# Patient Record
Sex: FEMALE | Race: WHITE | NOT HISPANIC OR LATINO | Employment: OTHER | URBAN - METROPOLITAN AREA
[De-identification: names, ages, dates, MRNs, and addresses within clinical notes are randomized per-mention and may not be internally consistent; named-entity substitution may affect disease eponyms.]

---

## 2017-02-19 ENCOUNTER — TELEPHONE (OUTPATIENT)
Dept: MEDICAL GROUP | Age: 67
End: 2017-02-19

## 2017-02-19 DIAGNOSIS — Z79.890 POSTMENOPAUSAL HRT (HORMONE REPLACEMENT THERAPY): ICD-10-CM

## 2017-02-19 RX ORDER — ESTRADIOL 2 MG/1
2 TABLET ORAL DAILY
Qty: 30 TAB | Refills: 0 | Status: SHIPPED | OUTPATIENT
Start: 2017-02-19 | End: 2017-05-25 | Stop reason: SDUPTHER

## 2017-02-19 NOTE — TELEPHONE ENCOUNTER
On call note:   Patient out of estradiol 2 mg last couple days or so and starting to feel irritable. Would like medication refilled.   Courtesy refill done for 30 day supply.   Patient advised otherwise to follow up with PCP.

## 2017-05-25 RX ORDER — ESTRADIOL 2 MG/1
TABLET ORAL
Qty: 30 TAB | Refills: 0 | Status: SHIPPED | OUTPATIENT
Start: 2017-05-25 | End: 2019-08-27

## 2017-08-14 RX ORDER — ESTRADIOL 2 MG/1
TABLET ORAL
Refills: 0 | OUTPATIENT
Start: 2017-08-14

## 2017-09-22 DIAGNOSIS — Z79.890 POSTMENOPAUSAL HRT (HORMONE REPLACEMENT THERAPY): ICD-10-CM

## 2017-09-22 DIAGNOSIS — Z79.899 MEDICATION MANAGEMENT: ICD-10-CM

## 2017-09-25 RX ORDER — PRAVASTATIN SODIUM 40 MG
TABLET ORAL
Qty: 90 TAB | Refills: 0 | Status: SHIPPED | OUTPATIENT
Start: 2017-09-25 | End: 2019-02-26

## 2017-09-25 RX ORDER — FENOFIBRATE 160 MG/1
TABLET ORAL
Qty: 90 TAB | Refills: 0 | Status: SHIPPED | OUTPATIENT
Start: 2017-09-25 | End: 2019-01-02 | Stop reason: SDUPTHER

## 2017-09-25 RX ORDER — ESTRADIOL 2 MG/1
TABLET ORAL
Qty: 90 TAB | Refills: 0 | Status: SHIPPED | OUTPATIENT
Start: 2017-09-25 | End: 2018-10-23 | Stop reason: SDUPTHER

## 2017-09-25 RX ORDER — OMEPRAZOLE 20 MG/1
CAPSULE, DELAYED RELEASE ORAL
Qty: 90 CAP | Refills: 0 | Status: SHIPPED | OUTPATIENT
Start: 2017-09-25 | End: 2019-02-26

## 2017-09-25 NOTE — TELEPHONE ENCOUNTER
Was the patient seen in the last year in this department? Yes     Does patient have an active prescription for medications requested? Yes     Received Request Via: Pharmacy     Last office visit: 12/07/2016  Last labs: 5/25/2016

## 2018-10-23 DIAGNOSIS — Z79.890 POSTMENOPAUSAL HRT (HORMONE REPLACEMENT THERAPY): ICD-10-CM

## 2018-10-23 RX ORDER — ESTRADIOL 2 MG/1
2 TABLET ORAL
Qty: 90 TAB | Refills: 0 | Status: SHIPPED | OUTPATIENT
Start: 2018-10-23 | End: 2019-01-02 | Stop reason: SDUPTHER

## 2018-12-12 RX ORDER — OMEPRAZOLE 20 MG/1
CAPSULE, DELAYED RELEASE ORAL
Qty: 90 CAP | Refills: 3 | Status: SHIPPED | OUTPATIENT
Start: 2018-12-12 | End: 2019-01-02 | Stop reason: SDUPTHER

## 2018-12-17 DIAGNOSIS — Z79.899 MEDICATION MANAGEMENT: ICD-10-CM

## 2018-12-17 RX ORDER — NICOTINE POLACRILEX 4 MG/1
1 GUM, CHEWING ORAL DAILY
Qty: 90 TAB | Refills: 3 | Status: SHIPPED | OUTPATIENT
Start: 2018-12-17 | End: 2018-12-18 | Stop reason: SDUPTHER

## 2018-12-18 DIAGNOSIS — Z79.899 MEDICATION MANAGEMENT: ICD-10-CM

## 2018-12-18 RX ORDER — NICOTINE POLACRILEX 4 MG/1
1 GUM, CHEWING ORAL DAILY
Qty: 90 TAB | Refills: 0 | Status: SHIPPED | OUTPATIENT
Start: 2018-12-18 | End: 2019-08-27

## 2018-12-18 NOTE — TELEPHONE ENCOUNTER
Was the patient seen in the last year in this department? Yes    Does patient have an active prescription for medications requested? Yes    Received Request Via: Patient     Patient needs refill sent to [facility]- it was sent to [facility] by mistake.

## 2019-01-02 ENCOUNTER — OFFICE VISIT (OUTPATIENT)
Dept: MEDICAL GROUP | Facility: MEDICAL CENTER | Age: 69
End: 2019-01-02
Payer: MEDICARE

## 2019-01-02 VITALS
WEIGHT: 180.12 LBS | TEMPERATURE: 98.2 F | HEART RATE: 86 BPM | OXYGEN SATURATION: 96 % | SYSTOLIC BLOOD PRESSURE: 122 MMHG | DIASTOLIC BLOOD PRESSURE: 78 MMHG | BODY MASS INDEX: 28.27 KG/M2 | HEIGHT: 67 IN

## 2019-01-02 DIAGNOSIS — Z79.890 POSTMENOPAUSAL HRT (HORMONE REPLACEMENT THERAPY): ICD-10-CM

## 2019-01-02 DIAGNOSIS — Z79.899 MEDICATION MANAGEMENT: ICD-10-CM

## 2019-01-02 DIAGNOSIS — E03.8 SUBCLINICAL HYPOTHYROIDISM: ICD-10-CM

## 2019-01-02 DIAGNOSIS — N18.30 CKD (CHRONIC KIDNEY DISEASE), STAGE III (HCC): ICD-10-CM

## 2019-01-02 DIAGNOSIS — Z00.00 HEALTH CARE MAINTENANCE: ICD-10-CM

## 2019-01-02 DIAGNOSIS — Z12.11 SCREENING FOR MALIGNANT NEOPLASM OF COLON: ICD-10-CM

## 2019-01-02 DIAGNOSIS — R73.01 IFG (IMPAIRED FASTING GLUCOSE): ICD-10-CM

## 2019-01-02 DIAGNOSIS — Z12.31 ENCOUNTER FOR SCREENING MAMMOGRAM FOR MALIGNANT NEOPLASM OF BREAST: ICD-10-CM

## 2019-01-02 DIAGNOSIS — E78.5 DYSLIPIDEMIA: ICD-10-CM

## 2019-01-02 PROCEDURE — 99214 OFFICE O/P EST MOD 30 MIN: CPT | Performed by: INTERNAL MEDICINE

## 2019-01-02 RX ORDER — OMEPRAZOLE 20 MG/1
CAPSULE, DELAYED RELEASE ORAL
Qty: 90 CAP | Refills: 3 | Status: SHIPPED | OUTPATIENT
Start: 2019-01-02 | End: 2019-12-04 | Stop reason: SDUPTHER

## 2019-01-02 RX ORDER — PRAVASTATIN SODIUM 40 MG
40 TABLET ORAL DAILY
Qty: 90 TAB | Refills: 0 | Status: SHIPPED | OUTPATIENT
Start: 2019-01-02 | End: 2019-02-26

## 2019-01-02 RX ORDER — ESTRADIOL 2 MG/1
2 TABLET ORAL
Qty: 90 TAB | Refills: 0 | Status: SHIPPED | OUTPATIENT
Start: 2019-01-02 | End: 2019-02-26 | Stop reason: SDUPTHER

## 2019-01-02 RX ORDER — FENOFIBRATE 160 MG/1
160 TABLET ORAL
Qty: 90 TAB | Refills: 0 | Status: SHIPPED | OUTPATIENT
Start: 2019-01-02 | End: 2019-02-26 | Stop reason: SDUPTHER

## 2019-01-02 ASSESSMENT — PATIENT HEALTH QUESTIONNAIRE - PHQ9: CLINICAL INTERPRETATION OF PHQ2 SCORE: 0

## 2019-01-02 NOTE — PROGRESS NOTES
CHIEF COMPLAINT  Chief Complaint   Patient presents with   • Medication Refill   IFG    HPI  Patient is a 68 y.o. female patient came to the office after 2 yrs for the following     IFG  The patient had elevated hemoglobin A1c and fasting blood sugar:     Ref. Range 9/4/2014 10:25 1/2/2015 07:59   A1c Range: 0.0-5.6 % 6.2 (H) 6.4 (H)        Ref. Range 5/25/2016    Glucose Range: 65-99 mg/dL 105 (H)      No polydipsia, polyphagia, polyuria.  No abdominal pain, weight loss, fatigue.  Diet: like sweets  Exercise: Daily   BMI: 28  Family history diabetes: Father     Dyslipidemia  The patient was on pravastatin 40 mg QD.  No myalgias, muscle cramps or pain.   Diet/exercise/BMI: As above  FH: mother     CKD stage III  The patient had decreased GFR, with normal creatinine and electrolytes.  No SOB, palpitations, peripheral swelling, change in urinary habits.   NSAIDs use: Almost daily  Fluid intake:  insufficient     Subclinical hypothyroidism  The patient had abnormal TSH:    Ref. Range 6/20/2014    TSH Latest Ref Range: 0.300-3.700 uIU/mL 5.100 (H)   No temperature intolerance. No change in hair/skin quality, BMs.   Family history of thyroid problems: Negative    HRT  The patient has been on estradiol daily, needs a refill.   Denies hot flushes, sweating, vaginal dryness, mood swings.     Reviewed PMH, PSH, FH, SH, ALL, HCM/IMM, no changes  Reviewed MEDS, no changes    Patient Active Problem List    Diagnosis Date Noted   • IFG (impaired fasting glucose) 01/02/2019   • CKD (chronic kidney disease), stage III (HCC) 01/02/2019   • Elevated TSH 12/03/2015   • Health care maintenance 12/03/2015   • Postmenopausal HRT (hormone replacement therapy) 06/25/2015   • Dyslipidemia 06/02/2015     CURRENT MEDICATIONS  Current Outpatient Prescriptions   Medication Sig Dispense Refill   • estradiol (ESTRACE) 2 MG Tab Take 1 Tab by mouth every day. 90 Tab 0   • fenofibrate (TRIGLIDE) 160 MG tablet Take 1 Tab by mouth every day. 90 Tab 0    • pravastatin (PRAVACHOL) 40 MG tablet Take 1 Tab by mouth every day. 90 Tab 0   • omeprazole (PRILOSEC) 20 MG delayed-release capsule TAKE 1 CAPSULE DAILY 90 Cap 3   • omeprazole (PRILOSEC) 20 MG delayed-release capsule TAKE 1 CAPSULE BY MOUTH EVERY DAY 90 Cap 0   • estradiol (ESTRACE) 2 MG Tab TAKE 1 TABLET BY MOUTH EVERY DAY 30 Tab 0   • fenofibrate (TRIGLIDE) 160 MG tablet Take 1 Tab by mouth every day. 90 Tab 0   • omeprazole (PRILOSEC) 20 MG Tablet Delayed Response delayed-release tablet Take 1 Tab by mouth every day. 90 Tab 0   • pravastatin (PRAVACHOL) 40 MG tablet TAKE 1 TABLET BY MOUTH EVERYDAY. 90 Tab 0   • azithromycin (ZITHROMAX) 500 MG tablet Take 1 Tab by mouth every day. 6 Tab 0   • fenofibrate (TRIGLIDE) 160 MG tablet TAKE 1 TABLET DAILY 90 Tab 1     No current facility-administered medications for this visit.      ALLERGIES  Allergies: Neosporin pain relieving  PAST MEDICAL HISTORY  Past Medical History:   Diagnosis Date   • Anxiety    • Dyslipidemia    • Hypovitaminosis D    • Memory problem    • Neck pain    • Panic attack      SURGICAL HISTORY  She  has a past surgical history that includes cervical fusion posterior (3/23/2009).  SOCIAL HISTORY  Social History   Substance Use Topics   • Smoking status: Never Smoker   • Smokeless tobacco: Never Used   • Alcohol use 0.6 oz/week     1 Standard drinks or equivalent per week     Social History     Social History Narrative    Lives with .     Children: 4.     Work: retired     FAMILY HISTORY  Family History   Problem Relation Age of Onset   • Cancer Mother         colon   • Heart Disease Mother    • Hypertension Mother    • Hyperlipidemia Mother    • Diabetes Father    • Psychiatry Neg Hx    • Stroke Neg Hx    • Thyroid Neg Hx      Family Status   Relation Status   • Mo (Not Specified)   • Fa (Not Specified)   • Neg Hx (Not Specified)     ROS   Constitutional: Negative for fever, chills.  HENT: Negative for congestion, sore throat.  Eyes:  "Negative for blurred vision.   Respiratory: Negative for cough, shortness of breath.  Cardiovascular: Negative for chest pain, palpitations.   Gastrointestinal: Negative for heartburn, nausea, abdominal pain.   Genitourinary: Negative for dysuria. And per HPI.  Musculoskeletal: Negative for significant myalgias, back pain and joint pain.   Skin: Negative for rash and itching.   Neuro: Negative for dizziness, weakness and headaches.   Endo/Heme/Allergies: Does not bruise/bleed easily. And per HPI.  Psychiatric/Behavioral: Negative for depression.    PHYSICAL EXAM   Blood pressure 122/78, pulse 86, temperature 36.8 °C (98.2 °F), temperature source Temporal, height 1.702 m (5' 7\"), weight 81.7 kg (180 lb 1.9 oz), SpO2 96 %, not currently breastfeeding. Body mass index is 28.21 kg/m².  General:  NAD, well appearing  HEENT:   NC/AT, PERRLA, EOMI, TMs are clear. Oropharyngeal mucosa is pink,  without lesions;  no cervical / supraclavicular  lymphadenopathy, no thyromegaly.    Cardiovascular: RRR.   No m/r/g. No carotid bruits .      Lungs:   CTAB, no w/r/r, no respiratory distress.  Abdomen: Soft, NT/ND + BS; no suprapubic tenderness; no masses or hepatosplenomegaly.  Extremities:  2+ DP and radial pulses bilaterally.  No c/c/e.   Skin:  Warm, dry.  No erythema. No rash.   Neurologic: Alert & oriented x 3. CN II-XII grossly intact. Brachioradialis / knee DTR are 2/4, symmetric. Strength and sensation grossly intact.  No focal deficits.  Psychiatric:  Affect normal, mood normal, judgment normal.    LABS     Labs are reviewed and discussed with a patient  Lab Results   Component Value Date/Time    CHOLSTRLTOT 244 (H) 05/25/2016 07:14 AM     (H) 05/25/2016 07:14 AM    HDL 47 05/25/2016 07:14 AM    TRIGLYCERIDE 352 (H) 05/25/2016 07:14 AM       Lab Results   Component Value Date/Time    SODIUM 139 05/25/2016 07:14 AM    POTASSIUM 4.3 05/25/2016 07:14 AM    CHLORIDE 106 05/25/2016 07:14 AM    CO2 25 05/25/2016 07:14 AM "    GLUCOSE 105 (H) 05/25/2016 07:14 AM    BUN 14 05/25/2016 07:14 AM    CREATININE 0.94 05/25/2016 07:14 AM     Lab Results   Component Value Date/Time    ALKPHOSPHAT 72 05/25/2016 07:14 AM    ASTSGOT 26 05/25/2016 07:14 AM    ALTSGPT 22 05/25/2016 07:14 AM    TBILIRUBIN 0.6 05/25/2016 07:14 AM      Lab Results   Component Value Date/Time    HBA1C 6.4 (H) 01/02/2015 07:59 AM    HBA1C 6.2 (H) 09/04/2014 10:25 AM     Lab Results   Component Value Date/Time    WBC 9.1 09/04/2014 10:25 AM    RBC 5.12 09/04/2014 10:25 AM    HEMOGLOBIN 15.5 09/04/2014 10:25 AM    HEMATOCRIT 46.1 09/04/2014 10:25 AM    MCV 90.0 09/04/2014 10:25 AM    MCH 30.3 09/04/2014 10:25 AM    MCHC 33.6 09/04/2014 10:25 AM    MPV 10.0 09/04/2014 10:25 AM    NEUTSPOLYS 54.0 09/04/2014 10:25 AM    LYMPHOCYTES 36.6 09/04/2014 10:25 AM    MONOCYTES 6.6 09/04/2014 10:25 AM    EOSINOPHILS 1.6 09/04/2014 10:25 AM    BASOPHILS 0.9 09/04/2014 10:25 AM      IMAGING     None    ASSESMENT AND PLAN        1. IFG (impaired fasting glucose)  Discussed about risk to develop DM.   Advised low carb diet, exercise, watch for WT.   - COMP METABOLIC PANEL; Future  - HEMOGLOBIN A1C; Future  - MICROALBUMIN CREAT RATIO URINE; Future    2. Dyslipidemia  Pending labs, restart pravastatin.    - COMP METABOLIC PANEL; Future  - Lipid Profile; Future  - pravastatin (PRAVACHOL) 40 MG tablet; Take 1 Tab by mouth every day.  Dispense: 90 Tab; Refill: 0    3. CKD (chronic kidney disease), stage III (HCC)  Advised to give good fluid intake .  30 ounces per day or 4 glasses and avoid NSAIDs.  - COMP METABOLIC PANEL; Future    4. Subclinical hypothyroidism  Follow-up labs  - TSH; Future  - FREE THYROXINE; Future    5. Postmenopausal HRT (hormone replacement therapy)  Refill, controlled:  - estradiol (ESTRACE) 2 MG Tab; Take 1 Tab by mouth every day.  Dispense: 90 Tab; Refill: 0    6. Encounter for screening mammogram for malignant neoplasm of breast  - MA-SCREEN MAMMO W/CAD-BILAT;  Standing    7. Screening for malignant neoplasm of colon  - REFERRAL TO GI FOR COLONOSCOPY    8. Medication management  - fenofibrate (TRIGLIDE) 160 MG tablet; Take 1 Tab by mouth every day.  Dispense: 90 Tab; Refill: 0    9.  Healthcare maintenance  Declined immunizations.    Counseling:   - Smoking:  Nonsmoker    Followup: Return in about 3 months (around 4/2/2019), or if symptoms worsen or fail to improve.    All questions are answered.    Please note that this dictation was created using voice recognition software, and that there might be errors of robert and possibly content.

## 2019-01-04 ENCOUNTER — TELEPHONE (OUTPATIENT)
Dept: MEDICAL GROUP | Facility: MEDICAL CENTER | Age: 69
End: 2019-01-04

## 2019-01-04 DIAGNOSIS — H54.7 VISION PROBLEM: ICD-10-CM

## 2019-01-04 NOTE — TELEPHONE ENCOUNTER
1. Caller Name: Rosie                                         Call Back Number: [phone] (home)       Patient approves a detailed voicemail message: no    2. SPECIFIC Action To Be Taken: Referral pending, please sign.    3. Diagnosis/Clinical Reason for Request: Eye Exam    4. Specialty & Provider Name/Lab/Imaging Location: [facility]    5. Is appointment scheduled for requested order/referral: yes - 1/3/19    Patient was not informed they will receive a return phone call from the office ONLY if there are any questions before processing their request. Advised to call back if they haven't received a call from the referral department in 5 days.

## 2019-01-17 ENCOUNTER — PATIENT OUTREACH (OUTPATIENT)
Dept: HEALTH INFORMATION MANAGEMENT | Facility: OTHER | Age: 69
End: 2019-01-17

## 2019-01-17 NOTE — PROGRESS NOTES
Outcome: NO ANSWER, NO VM    Please transfer to Patient Outreach Team at [phone] when patient returns call.    WebIZ Checked & Epic Updated:  yes    [facility] Verified: yes    Attempt # 1

## 2019-01-29 NOTE — PROGRESS NOTES
Outcome: NO ANSWER NO VMAIL    Please transfer to Patient Outreach Team at [phone] when patient returns call.       Attempt # 2

## 2019-01-31 NOTE — PROGRESS NOTES
Outcome: no answer / no vmail    Please transfer to Patient Outreach Team at [phone] when patient returns call.       Attempt # 3

## 2019-02-06 ENCOUNTER — HOSPITAL ENCOUNTER (OUTPATIENT)
Dept: LAB | Facility: MEDICAL CENTER | Age: 69
End: 2019-02-06
Attending: INTERNAL MEDICINE
Payer: MEDICARE

## 2019-02-06 DIAGNOSIS — R73.01 IFG (IMPAIRED FASTING GLUCOSE): ICD-10-CM

## 2019-02-06 DIAGNOSIS — E78.5 DYSLIPIDEMIA: ICD-10-CM

## 2019-02-06 DIAGNOSIS — E03.8 SUBCLINICAL HYPOTHYROIDISM: ICD-10-CM

## 2019-02-06 DIAGNOSIS — N18.30 CKD (CHRONIC KIDNEY DISEASE), STAGE III (HCC): ICD-10-CM

## 2019-02-06 LAB
ALBUMIN SERPL BCP-MCNC: 4.1 G/DL (ref 3.2–4.9)
ALBUMIN/GLOB SERPL: 1.8 G/DL
ALP SERPL-CCNC: 77 U/L (ref 30–99)
ALT SERPL-CCNC: 20 U/L (ref 2–50)
ANION GAP SERPL CALC-SCNC: 7 MMOL/L (ref 0–11.9)
AST SERPL-CCNC: 18 U/L (ref 12–45)
BILIRUB SERPL-MCNC: 0.4 MG/DL (ref 0.1–1.5)
BUN SERPL-MCNC: 21 MG/DL (ref 8–22)
CALCIUM SERPL-MCNC: 9 MG/DL (ref 8.5–10.5)
CHLORIDE SERPL-SCNC: 105 MMOL/L (ref 96–112)
CHOLEST SERPL-MCNC: 221 MG/DL (ref 100–199)
CO2 SERPL-SCNC: 27 MMOL/L (ref 20–33)
CREAT SERPL-MCNC: 0.86 MG/DL (ref 0.5–1.4)
CREAT UR-MCNC: 62 MG/DL
EST. AVERAGE GLUCOSE BLD GHB EST-MCNC: 128 MG/DL
FASTING STATUS PATIENT QL REPORTED: NORMAL
GLOBULIN SER CALC-MCNC: 2.3 G/DL (ref 1.9–3.5)
GLUCOSE SERPL-MCNC: 96 MG/DL (ref 65–99)
HBA1C MFR BLD: 6.1 % (ref 0–5.6)
HDLC SERPL-MCNC: 63 MG/DL
LDLC SERPL CALC-MCNC: 121 MG/DL
MICROALBUMIN UR-MCNC: <0.7 MG/DL
MICROALBUMIN/CREAT UR: NORMAL MG/G (ref 0–30)
POTASSIUM SERPL-SCNC: 4.1 MMOL/L (ref 3.6–5.5)
PROT SERPL-MCNC: 6.4 G/DL (ref 6–8.2)
SODIUM SERPL-SCNC: 139 MMOL/L (ref 135–145)
T4 FREE SERPL-MCNC: 0.67 NG/DL (ref 0.53–1.43)
TRIGL SERPL-MCNC: 187 MG/DL (ref 0–149)
TSH SERPL DL<=0.005 MIU/L-ACNC: 5.05 UIU/ML (ref 0.38–5.33)

## 2019-02-06 PROCEDURE — 83036 HEMOGLOBIN GLYCOSYLATED A1C: CPT

## 2019-02-06 PROCEDURE — 80061 LIPID PANEL: CPT

## 2019-02-06 PROCEDURE — 82570 ASSAY OF URINE CREATININE: CPT

## 2019-02-06 PROCEDURE — 84439 ASSAY OF FREE THYROXINE: CPT

## 2019-02-06 PROCEDURE — 36415 COLL VENOUS BLD VENIPUNCTURE: CPT

## 2019-02-06 PROCEDURE — 82043 UR ALBUMIN QUANTITATIVE: CPT

## 2019-02-06 PROCEDURE — 84443 ASSAY THYROID STIM HORMONE: CPT

## 2019-02-06 PROCEDURE — 80053 COMPREHEN METABOLIC PANEL: CPT

## 2019-02-25 ENCOUNTER — TELEPHONE (OUTPATIENT)
Dept: MEDICAL GROUP | Facility: MEDICAL CENTER | Age: 69
End: 2019-02-25

## 2019-02-25 NOTE — TELEPHONE ENCOUNTER
ESTABLISHED PATIENT PRE-VISIT PLANNING     Patient was contacted to complete PVP.     Note: Patient will not be contacted if there is no indication to call.     1.  Reviewed notes from the last few office visits within the medical group: Yes    2.  If any orders were placed at last visit or intended to be done for this visit (i.e. 6 mos follow-up), do we have Results/Consult Notes?        •  Labs - Labs ordered, completed on 2/6/19 and results are in chart.   Note: If patient appointment is for lab review and patient did not complete labs, check with provider if OK to reschedule patient until labs completed.       •  Imaging - Imaging was not ordered at last office visit.       •  Referrals - Referral ordered, patient has NOT been seen.    3. Is this appointment scheduled as a Hospital Follow-Up? No    4.  Immunizations were updated in Epic using WebIZ?: Epic matches WebIZ       •  Web Iz Recommendations: FLU, PREVNAR (PCV13) , TD and SHINGRIX (Shingles)    5.  Patient is due for the following Health Maintenance Topics:   Health Maintenance Due   Topic Date Due   • IMM ZOSTER VACCINES (1 of 2) 09/29/1998   • IMM PNEUMOCOCCAL 65+ (ADULT) LOW/MEDIUM RISK SERIES (1 of 2 - PCV13) 09/29/2013   • MAMMOGRAM  06/02/2016   • IMM INFLUENZA (1) 09/01/2018       - Patient plans to schedule appointment for Immunizations: FLU, PREVNAR (PCV13)  and SHINGRIX (Shingles) and Mammogram. Pt states she would like to update her health maintenance but she needs to wait for her  and talk with her PCP first.    6. Orders for overdue Health Maintenance topics pended in Pre-Charting? NO    7.  AHA (MDX) form printed for Provider? YES    8.  Patient was informed to arrive 15 min prior to their scheduled appointment and bring in their medication bottles.

## 2019-02-26 ENCOUNTER — OFFICE VISIT (OUTPATIENT)
Dept: MEDICAL GROUP | Facility: MEDICAL CENTER | Age: 69
End: 2019-02-26
Payer: MEDICARE

## 2019-02-26 VITALS
DIASTOLIC BLOOD PRESSURE: 78 MMHG | BODY MASS INDEX: 28.55 KG/M2 | WEIGHT: 181.88 LBS | OXYGEN SATURATION: 95 % | HEART RATE: 69 BPM | SYSTOLIC BLOOD PRESSURE: 112 MMHG | HEIGHT: 67 IN | TEMPERATURE: 97.8 F

## 2019-02-26 DIAGNOSIS — Z79.890 POSTMENOPAUSAL HRT (HORMONE REPLACEMENT THERAPY): ICD-10-CM

## 2019-02-26 DIAGNOSIS — E78.5 DYSLIPIDEMIA: ICD-10-CM

## 2019-02-26 DIAGNOSIS — R73.01 IFG (IMPAIRED FASTING GLUCOSE): ICD-10-CM

## 2019-02-26 DIAGNOSIS — Z00.00 HEALTH CARE MAINTENANCE: ICD-10-CM

## 2019-02-26 DIAGNOSIS — N18.30 CKD (CHRONIC KIDNEY DISEASE), STAGE III (HCC): ICD-10-CM

## 2019-02-26 PROCEDURE — 99214 OFFICE O/P EST MOD 30 MIN: CPT | Performed by: INTERNAL MEDICINE

## 2019-02-26 RX ORDER — FENOFIBRATE 160 MG/1
160 TABLET ORAL
Qty: 90 TAB | Refills: 3 | Status: SHIPPED | OUTPATIENT
Start: 2019-02-26 | End: 2019-08-27 | Stop reason: SDUPTHER

## 2019-02-26 RX ORDER — ESTRADIOL 2 MG/1
2 TABLET ORAL
Qty: 90 TAB | Refills: 0 | Status: SHIPPED | OUTPATIENT
Start: 2019-02-26 | End: 2019-08-27 | Stop reason: SDUPTHER

## 2019-02-26 RX ORDER — ATORVASTATIN CALCIUM 40 MG/1
40 TABLET, FILM COATED ORAL DAILY
Qty: 90 TAB | Refills: 1 | Status: SHIPPED | OUTPATIENT
Start: 2019-02-26 | End: 2019-08-27 | Stop reason: SDUPTHER

## 2019-02-26 NOTE — PROGRESS NOTES
CHIEF COMPLAINT  Chief Complaint   Patient presents with   • Results   IFG    HPI  Patient is a 68 y.o. female patient who presents today for the following     IFG  The patient had elevated hemoglobin A1c and fasting blood sugar:   No abdominal pain, weight loss, fatigue.  Diet: like sweets  Exercise: Daily   BMI: 28  Family history diabetes: Father     Dyslipidemia  Not at goal.    The patient was on pravastatin 40 mg QD.  No myalgias, muscle cramps or pain.   Diet/exercise/BMI: As above  FH: mother     CKD stage III  The patient had decreased GFR, with normal creatinine and electrolytes.  No SOB, palpitations, peripheral swelling, change in urinary habits.   NSAIDs use: Almost daily  Fluid intake: insufficient     HRT  The patient has been on estradiol daily, needs a refill.   Denies hot flushes, sweating, vaginal dryness, mood swings.    Reviewed PMH, PSH, FH, SH, ALL, HCM/IMM, no changes  Reviewed MEDS, no changes    Patient Active Problem List    Diagnosis Date Noted   • IFG (impaired fasting glucose) 01/02/2019   • CKD (chronic kidney disease), stage III (HCC) 01/02/2019   • Health care maintenance 12/03/2015   • Postmenopausal HRT (hormone replacement therapy) 06/25/2015   • Dyslipidemia 06/02/2015     CURRENT MEDICATIONS  Current Outpatient Prescriptions   Medication Sig Dispense Refill   • atorvastatin (LIPITOR) 40 MG Tab Take 1 Tab by mouth every day. 90 Tab 1   • fenofibrate (TRIGLIDE) 160 MG tablet Take 1 Tab by mouth every day. 90 Tab 3   • estradiol (ESTRACE) 2 MG Tab Take 1 Tab by mouth every day. 90 Tab 0   • estradiol (ESTRACE) 2 MG Tab TAKE 1 TABLET BY MOUTH EVERY DAY 30 Tab 0   • fenofibrate (TRIGLIDE) 160 MG tablet Take 1 Tab by mouth every day. 90 Tab 0   • omeprazole (PRILOSEC) 20 MG delayed-release capsule TAKE 1 CAPSULE DAILY 90 Cap 3   • omeprazole (PRILOSEC) 20 MG Tablet Delayed Response delayed-release tablet Take 1 Tab by mouth every day. 90 Tab 0   • azithromycin (ZITHROMAX) 500 MG  tablet Take 1 Tab by mouth every day. 6 Tab 0   • fenofibrate (TRIGLIDE) 160 MG tablet TAKE 1 TABLET DAILY 90 Tab 1     No current facility-administered medications for this visit.      ALLERGIES  Allergies: Neosporin pain relieving  PAST MEDICAL HISTORY  Past Medical History:   Diagnosis Date   • Anxiety    • Dyslipidemia    • Hypovitaminosis D    • Memory problem    • Neck pain    • Panic attack      SURGICAL HISTORY  She  has a past surgical history that includes cervical fusion posterior (3/23/2009).  SOCIAL HISTORY  Social History   Substance Use Topics   • Smoking status: Never Smoker   • Smokeless tobacco: Never Used   • Alcohol use 0.6 oz/week     1 Standard drinks or equivalent per week     Social History     Social History Narrative    Lives with .     Children: 4.     Work: retired     FAMILY HISTORY  Family History   Problem Relation Age of Onset   • Cancer Mother         colon   • Heart Disease Mother    • Hypertension Mother    • Hyperlipidemia Mother    • Diabetes Father    • Psychiatry Neg Hx    • Stroke Neg Hx    • Thyroid Neg Hx      Family Status   Relation Status   • Mo (Not Specified)   • Fa (Not Specified)   • Neg Hx (Not Specified)     ROS   Constitutional: Negative for fever, chills.  HENT: Negative for congestion, sore throat.  Eyes: Negative for blurred vision.   Respiratory: Negative for cough, shortness of breath.  Cardiovascular: Negative for chest pain, palpitations.   Gastrointestinal: Negative for heartburn, abdominal pain.   Genitourinary: Negative for dysuria. And per HPI.  Musculoskeletal: Negative for significant  back and joint pain.   Skin: Negative for rash and itching.   Neuro: Negative for dizziness, weakness and headaches.   Endo/Heme/Allergies: Does not bruise/bleed easily. And per HPI.  Psychiatric/Behavioral: Negative for depression.    PHYSICAL EXAM   Blood pressure 112/78, pulse 69, temperature 36.6 °C (97.8 °F), temperature source Temporal, height 1.702 m (5'  "7\"), weight 82.5 kg (181 lb 14.1 oz), SpO2 95 %, not currently breastfeeding. Body mass index is 28.49 kg/m².  General:  NAD, well appearing  HEENT:   NC/AT, PERRLA, EOMI, TMs are clear. Oropharyngeal mucosa is pink,  without lesions;  no cervical / supraclavicular  lymphadenopathy, no thyromegaly.    Cardiovascular: RRR.   No m/r/g. No carotid bruits .      Lungs:   CTAB, no w/r/r, no respiratory distress.  Abdomen: Soft, NT/ND..  Extremities:  2+ DP and radial pulses bilaterally.  No c/c/e.   Skin:  Warm, dry.  No erythema. No rash.   Neurologic: Alert & oriented x 3. CN II-XII grossly intact. No focal deficits.  Psychiatric:  Affect normal, mood normal, judgment normal.    LABS     Labs are reviewed and discussed with a patient  Lab Results   Component Value Date/Time    CHOLSTRLTOT 221 (H) 02/06/2019 06:13 AM     (H) 02/06/2019 06:13 AM    HDL 63 02/06/2019 06:13 AM    TRIGLYCERIDE 187 (H) 02/06/2019 06:13 AM       Lab Results   Component Value Date/Time    SODIUM 139 02/06/2019 06:13 AM    POTASSIUM 4.1 02/06/2019 06:13 AM    CHLORIDE 105 02/06/2019 06:13 AM    CO2 27 02/06/2019 06:13 AM    GLUCOSE 96 02/06/2019 06:13 AM    BUN 21 02/06/2019 06:13 AM    CREATININE 0.86 02/06/2019 06:13 AM     Lab Results   Component Value Date/Time    ALKPHOSPHAT 77 02/06/2019 06:13 AM    ASTSGOT 18 02/06/2019 06:13 AM    ALTSGPT 20 02/06/2019 06:13 AM    TBILIRUBIN 0.4 02/06/2019 06:13 AM      Lab Results   Component Value Date/Time    HBA1C 6.1 (H) 02/06/2019 06:13 AM    HBA1C 6.4 (H) 01/02/2015 07:59 AM    HBA1C 6.2 (H) 09/04/2014 10:25 AM     No results found for: TSH  Lab Results   Component Value Date/Time    FREET4 0.67 02/06/2019 06:13 AM     Lab Results   Component Value Date/Time    WBC 9.1 09/04/2014 10:25 AM    RBC 5.12 09/04/2014 10:25 AM    HEMOGLOBIN 15.5 09/04/2014 10:25 AM    HEMATOCRIT 46.1 09/04/2014 10:25 AM    MCV 90.0 09/04/2014 10:25 AM    MCH 30.3 09/04/2014 10:25 AM    MCHC 33.6 09/04/2014 10:25 " AM    MPV 10.0 09/04/2014 10:25 AM    NEUTSPOLYS 54.0 09/04/2014 10:25 AM    LYMPHOCYTES 36.6 09/04/2014 10:25 AM    MONOCYTES 6.6 09/04/2014 10:25 AM    EOSINOPHILS 1.6 09/04/2014 10:25 AM    BASOPHILS 0.9 09/04/2014 10:25 AM        IMAGING     None    ASSESMENT AND PLAN        1. IFG (impaired fasting glucose)  Improved, continue current lifestyle.  - Comp Metabolic Panel; Future  - HEMOGLOBIN A1C; Future    2. Dyslipidemia  Not at goal, change pravastatin to atorvastatin::    - Comp Metabolic Panel; Future  - Lipid Profile; Future  - fenofibrate (TRIGLIDE) 160 MG tablet; Take 1 Tab by mouth every day.  Dispense: 90 Tab; Refill: 3    3. CKD (chronic kidney disease), stage III (HCC)  Improved, continue good fluid intake  - Comp Metabolic Panel; Future    4. Postmenopausal HRT (hormone replacement therapy)  Controlled postmenopausal symptoms, continue current treatment  - estradiol (ESTRACE) 2 MG Tab; Take 1 Tab by mouth every day.  Dispense: 90 Tab; Refill: 0    5. Health care maintenance  Declined mammography, colonoscopy.  Advised immunizations x3.    Counseling:   - Smoking:  Nonsmoker    Followup: Return in about 6 months (around 8/26/2019).    All questions are answered.    Please note that this dictation was created using voice recognition software, and that there might be errors of robert and possibly content.

## 2019-04-25 DIAGNOSIS — M79.89 LEG SWELLING: ICD-10-CM

## 2019-04-25 RX ORDER — HYDROCHLOROTHIAZIDE 12.5 MG/1
12.5 CAPSULE, GELATIN COATED ORAL DAILY
Qty: 90 CAP | Refills: 1 | Status: SHIPPED | OUTPATIENT
Start: 2019-04-25 | End: 2019-08-27 | Stop reason: SDUPTHER

## 2019-04-30 ENCOUNTER — TELEPHONE (OUTPATIENT)
Dept: MEDICAL GROUP | Facility: MEDICAL CENTER | Age: 69
End: 2019-04-30

## 2019-04-30 DIAGNOSIS — H54.7 VISION PROBLEM: ICD-10-CM

## 2019-05-06 ENCOUNTER — HOSPITAL ENCOUNTER (OUTPATIENT)
Dept: RADIOLOGY | Facility: MEDICAL CENTER | Age: 69
End: 2019-05-06
Attending: PODIATRIST
Payer: MEDICARE

## 2019-05-06 DIAGNOSIS — I74.3 EMBOLISM AND THROMBOSIS OF ARTERIES OF LOWER EXTREMITY (HCC): ICD-10-CM

## 2019-05-06 PROCEDURE — 93970 EXTREMITY STUDY: CPT | Mod: 26 | Performed by: SURGERY

## 2019-05-06 PROCEDURE — 93970 EXTREMITY STUDY: CPT

## 2019-08-17 ENCOUNTER — HOSPITAL ENCOUNTER (OUTPATIENT)
Dept: LAB | Facility: MEDICAL CENTER | Age: 69
End: 2019-08-17
Attending: INTERNAL MEDICINE
Payer: MEDICARE

## 2019-08-17 DIAGNOSIS — R73.01 IFG (IMPAIRED FASTING GLUCOSE): ICD-10-CM

## 2019-08-17 DIAGNOSIS — N18.30 CKD (CHRONIC KIDNEY DISEASE), STAGE III (HCC): ICD-10-CM

## 2019-08-17 DIAGNOSIS — E78.5 DYSLIPIDEMIA: ICD-10-CM

## 2019-08-17 LAB
ALBUMIN SERPL BCP-MCNC: 4.2 G/DL (ref 3.2–4.9)
ALBUMIN/GLOB SERPL: 1.6 G/DL
ALP SERPL-CCNC: 59 U/L (ref 30–99)
ALT SERPL-CCNC: 14 U/L (ref 2–50)
ANION GAP SERPL CALC-SCNC: 7 MMOL/L (ref 0–11.9)
AST SERPL-CCNC: 15 U/L (ref 12–45)
BILIRUB SERPL-MCNC: 0.6 MG/DL (ref 0.1–1.5)
BUN SERPL-MCNC: 18 MG/DL (ref 8–22)
CALCIUM SERPL-MCNC: 9.4 MG/DL (ref 8.5–10.5)
CHLORIDE SERPL-SCNC: 110 MMOL/L (ref 96–112)
CHOLEST SERPL-MCNC: 170 MG/DL (ref 100–199)
CO2 SERPL-SCNC: 25 MMOL/L (ref 20–33)
CREAT SERPL-MCNC: 0.96 MG/DL (ref 0.5–1.4)
EST. AVERAGE GLUCOSE BLD GHB EST-MCNC: 128 MG/DL
GLOBULIN SER CALC-MCNC: 2.7 G/DL (ref 1.9–3.5)
GLUCOSE SERPL-MCNC: 99 MG/DL (ref 65–99)
HBA1C MFR BLD: 6.1 % (ref 0–5.6)
HDLC SERPL-MCNC: 48 MG/DL
LDLC SERPL CALC-MCNC: 93 MG/DL
POTASSIUM SERPL-SCNC: 4.2 MMOL/L (ref 3.6–5.5)
PROT SERPL-MCNC: 6.9 G/DL (ref 6–8.2)
SODIUM SERPL-SCNC: 142 MMOL/L (ref 135–145)
TRIGL SERPL-MCNC: 144 MG/DL (ref 0–149)

## 2019-08-17 PROCEDURE — 80061 LIPID PANEL: CPT

## 2019-08-17 PROCEDURE — 36415 COLL VENOUS BLD VENIPUNCTURE: CPT

## 2019-08-17 PROCEDURE — 83036 HEMOGLOBIN GLYCOSYLATED A1C: CPT

## 2019-08-17 PROCEDURE — 80053 COMPREHEN METABOLIC PANEL: CPT

## 2019-08-21 ENCOUNTER — TELEPHONE (OUTPATIENT)
Dept: MEDICAL GROUP | Facility: MEDICAL CENTER | Age: 69
End: 2019-08-21

## 2019-08-21 NOTE — TELEPHONE ENCOUNTER
ESTABLISHED PATIENT PRE-VISIT PLANNING     Patient was NOT contacted to complete PVP.     Note: Patient will not be contacted if there is no indication to call.     1.  Reviewed notes from the last few office visits within the medical group: Yes    2.  If any orders were placed at last visit or intended to be done for this visit (i.e. 6 mos follow-up), do we have Results/Consult Notes?        •  Labs - Labs ordered, completed on 08/17/2019 and results are in chart.   Note: If patient appointment is for lab review and patient did not complete labs, check with provider if OK to reschedule patient until labs completed.       •  Imaging - Imaging was not ordered at last office visit.       •  Referrals - Referral ordered, patient was seen and consult notes are in chart. Care Teams updated  YES.    3. Is this appointment scheduled as a Hospital Follow-Up? No    4.  Immunizations were updated in Epic using WebIZ?: Epic matches WebIZ       •  Web Iz Recommendations: PREVNAR (PCV13)  and SHINGRIX (Shingles)    5.  Patient is due for the following Health Maintenance Topics:   Health Maintenance Due   Topic Date Due   • HEPATITIS C SCREENING  1950   • IMM HEP B VACCINE (1 of 3 - Risk 3-dose series) 09/29/1967   • IMM ZOSTER VACCINES (1 of 2) 09/29/1998   • IMM PNEUMOCOCCAL VACCINE: 65+ Years (1 of 2 - PCV13) 09/29/2013   • MAMMOGRAM  06/02/2016       - Patient has completed TDAP Immunization(s) per WebIZ. Chart has been updated.    6. Orders for overdue Health Maintenance topics pended in Pre-Charting? NO    7.  AHA (MDX) form printed for Provider? No, already completed    8.  Patient was NOT informed to arrive 15 min prior to their scheduled appointment and bring in their medication bottles.

## 2019-08-27 ENCOUNTER — OFFICE VISIT (OUTPATIENT)
Dept: MEDICAL GROUP | Facility: MEDICAL CENTER | Age: 69
End: 2019-08-27
Payer: MEDICARE

## 2019-08-27 VITALS
OXYGEN SATURATION: 96 % | HEIGHT: 67 IN | BODY MASS INDEX: 27.92 KG/M2 | TEMPERATURE: 96.9 F | DIASTOLIC BLOOD PRESSURE: 60 MMHG | HEART RATE: 59 BPM | SYSTOLIC BLOOD PRESSURE: 110 MMHG | WEIGHT: 177.91 LBS

## 2019-08-27 DIAGNOSIS — Z11.59 NEED FOR HEPATITIS C SCREENING TEST: ICD-10-CM

## 2019-08-27 DIAGNOSIS — R73.01 IFG (IMPAIRED FASTING GLUCOSE): ICD-10-CM

## 2019-08-27 DIAGNOSIS — N18.30 CKD (CHRONIC KIDNEY DISEASE), STAGE III (HCC): ICD-10-CM

## 2019-08-27 DIAGNOSIS — K21.9 GASTROESOPHAGEAL REFLUX DISEASE, ESOPHAGITIS PRESENCE NOT SPECIFIED: ICD-10-CM

## 2019-08-27 DIAGNOSIS — Z12.31 ENCOUNTER FOR SCREENING MAMMOGRAM FOR MALIGNANT NEOPLASM OF BREAST: ICD-10-CM

## 2019-08-27 DIAGNOSIS — E78.5 DYSLIPIDEMIA: ICD-10-CM

## 2019-08-27 DIAGNOSIS — M79.89 LEG SWELLING: ICD-10-CM

## 2019-08-27 DIAGNOSIS — Z79.890 POSTMENOPAUSAL HRT (HORMONE REPLACEMENT THERAPY): ICD-10-CM

## 2019-08-27 DIAGNOSIS — Z00.00 HEALTH CARE MAINTENANCE: ICD-10-CM

## 2019-08-27 PROCEDURE — 99214 OFFICE O/P EST MOD 30 MIN: CPT | Performed by: INTERNAL MEDICINE

## 2019-08-27 RX ORDER — ATORVASTATIN CALCIUM 40 MG/1
40 TABLET, FILM COATED ORAL DAILY
Qty: 90 TAB | Refills: 1 | Status: SHIPPED | OUTPATIENT
Start: 2019-08-27 | End: 2019-12-04 | Stop reason: SDUPTHER

## 2019-08-27 RX ORDER — HYDROCHLOROTHIAZIDE 12.5 MG/1
12.5 CAPSULE, GELATIN COATED ORAL DAILY
Qty: 90 CAP | Refills: 1 | Status: SHIPPED | OUTPATIENT
Start: 2019-08-27 | End: 2019-12-04 | Stop reason: SDUPTHER

## 2019-08-27 RX ORDER — FENOFIBRATE 160 MG/1
160 TABLET ORAL
Qty: 90 TAB | Refills: 3 | Status: SHIPPED | OUTPATIENT
Start: 2019-08-27 | End: 2019-12-04 | Stop reason: SDUPTHER

## 2019-08-27 RX ORDER — ESTRADIOL 2 MG/1
2 TABLET ORAL
Qty: 90 TAB | Refills: 3 | Status: SHIPPED | OUTPATIENT
Start: 2019-08-27 | End: 2020-12-31 | Stop reason: SDUPTHER

## 2019-08-27 NOTE — PROGRESS NOTES
CHIEF COMPLAINT  IFG, labs    HPI  Jessica Sorensen is a 68 y.o. female who presents today for the following     IFG  The patient had elevated hemoglobin A1c and fasting blood sugar:   No abdominal pain, weight loss, fatigue.  Diet: like sweets  Exercise: Daily   BMI: 28  Family history diabetes: Father     Dyslipidemia  The patient was on pravastatin 40 mg QD.  No myalgias, muscle cramps or pain.   Diet/exercise/BMI: As above  FH: mother     CKD stage III  The patient had decreased GFR, with normal creatinine and electrolytes.  No SOB, palpitations, peripheral swelling, change in urinary habits.   NSAIDs use: Almost daily  Fluid intake: insufficient     GERD  On omeprazole, 20 mg QD; takes medication as prescribed, that controls sx.   Denies:   - heartburn, epigastric pain.   -  nausea, vomiting, or diarrhea  - dysphagia  - abnormal cough  - blood in the stool or dark tarry stools.  - early satiety  - tobacco use.    HRT  The patient has been on estradiol daily, needs a refill.   Denies hot flushes, sweating, vaginal dryness, mood swings.     Reviewed PMH, PSH, FH, SH, ALL, HCM/IMM, no changes  Reviewed MEDS, no changes    Patient Active Problem List    Diagnosis Date Noted   • IFG (impaired fasting glucose) 01/02/2019   • CKD (chronic kidney disease), stage III (HCC) 01/02/2019   • Health care maintenance 12/03/2015   • Postmenopausal HRT (hormone replacement therapy) 06/25/2015   • Dyslipidemia 06/02/2015     CURRENT MEDICATIONS  Current Outpatient Medications   Medication Sig Dispense Refill   • hydrochlorothiazide (MICROZIDE) 12.5 MG capsule Take 1 Cap by mouth every day. 90 Cap 1   • atorvastatin (LIPITOR) 40 MG Tab Take 1 Tab by mouth every day. 90 Tab 1   • fenofibrate (TRIGLIDE) 160 MG tablet Take 1 Tab by mouth every day. 90 Tab 3   • estradiol (ESTRACE) 2 MG Tab Take 1 Tab by mouth every day. 90 Tab 0   • omeprazole (PRILOSEC) 20 MG delayed-release capsule TAKE 1 CAPSULE DAILY 90 Cap 3   • omeprazole  (PRILOSEC) 20 MG Tablet Delayed Response delayed-release tablet Take 1 Tab by mouth every day. 90 Tab 0   • estradiol (ESTRACE) 2 MG Tab TAKE 1 TABLET BY MOUTH EVERY DAY 30 Tab 0   • fenofibrate (TRIGLIDE) 160 MG tablet Take 1 Tab by mouth every day. 90 Tab 0     No current facility-administered medications for this visit.      ALLERGIES  Allergies: Neosporin pain relieving  PAST MEDICAL HISTORY  Past Medical History:   Diagnosis Date   • Anxiety    • Dyslipidemia    • Hypovitaminosis D    • Memory problem    • Neck pain    • Panic attack      SURGICAL HISTORY  She  has a past surgical history that includes cervical fusion posterior (3/23/2009).  SOCIAL HISTORY  Social History     Tobacco Use   • Smoking status: Never Smoker   • Smokeless tobacco: Never Used   Substance Use Topics   • Alcohol use: Yes     Alcohol/week: 0.6 oz     Types: 1 Standard drinks or equivalent per week   • Drug use: Not on file     Social History     Social History Narrative    Lives with .     Children: 4.     Work: retired     FAMILY HISTORY  Family History   Problem Relation Age of Onset   • Cancer Mother         colon   • Heart Disease Mother    • Hypertension Mother    • Hyperlipidemia Mother    • Diabetes Father    • Psychiatric Illness Neg Hx    • Stroke Neg Hx    • Thyroid Neg Hx      Family Status   Relation Name Status   • Mo  (Not Specified)   • Fa  (Not Specified)   • Neg Hx  (Not Specified)     ROS   Constitutional: Negative for fever, chills, fatigue.  HENT: Negative for congestion, sore throat.  Eyes: Negative for vision problems.   Respiratory: Negative for cough, shortness of breath.  Cardiovascular: Negative for chest pain, palpitations.   Gastrointestinal: Negative for heartburn, nausea, abdominal pain.   Genitourinary: Negative for dysuria.  Musculoskeletal: Negative for significant myalgia, back and joint pain.   Skin: Negative for rash.   Neuro: Negative for dizziness, weakness and headaches.  "  Endo/Heme/Allergies: Does not bruise/bleed easily.   Psychiatric/Behavioral: Negative for depression.    PHYSICAL EXAM   /60   Pulse (!) 59   Temp 36.1 °C (96.9 °F) (Temporal)   Ht 1.702 m (5' 7\")   Wt 80.7 kg (177 lb 14.6 oz)   SpO2 96%   BMI 27.86 kg/m²   General:  NAD, well appearing  HEENT:   NC/AT, PERRLA, EOMI, TMs are clear. Oropharyngeal mucosa is pink,  without lesions;  no cervical / supraclavicular  lymphadenopathy, no thyromegaly.    Cardiovascular: RRR.   No m/r/g.       Lungs:   CTAB, no w/r/r, no respiratory distress.  Abdomen: Soft, NT/ND; no hepatosplenomegaly.  Extremities:  2+ DP and radial pulses bilaterally.  No c/c/e.   Skin:  Warm, dry.  No erythema. No rash.   Neurologic: Alert & oriented x 3. CN II-XII grossly intact. No focal deficits.  Psychiatric:  Affect normal, mood normal, judgment normal.    Labs     Labs are reviewed and discussed with a patient  Lab Results   Component Value Date/Time    CHOLSTRLTOT 170 08/17/2019 07:28 AM    LDL 93 08/17/2019 07:28 AM    HDL 48 08/17/2019 07:28 AM    TRIGLYCERIDE 144 08/17/2019 07:28 AM       Lab Results   Component Value Date/Time    SODIUM 142 08/17/2019 07:28 AM    POTASSIUM 4.2 08/17/2019 07:28 AM    CHLORIDE 110 08/17/2019 07:28 AM    CO2 25 08/17/2019 07:28 AM    GLUCOSE 99 08/17/2019 07:28 AM    BUN 18 08/17/2019 07:28 AM    CREATININE 0.96 08/17/2019 07:28 AM     Lab Results   Component Value Date/Time    ALKPHOSPHAT 59 08/17/2019 07:28 AM    ASTSGOT 15 08/17/2019 07:28 AM    ALTSGPT 14 08/17/2019 07:28 AM    TBILIRUBIN 0.6 08/17/2019 07:28 AM      Lab Results   Component Value Date/Time    HBA1C 6.1 (H) 08/17/2019 07:28 AM    HBA1C 6.1 (H) 02/06/2019 06:13 AM    HBA1C 6.4 (H) 01/02/2015 07:59 AM     No results found for: TSH  Lab Results   Component Value Date/Time    FREET4 0.67 02/06/2019 06:13 AM     Lab Results   Component Value Date/Time    WBC 9.1 09/04/2014 10:25 AM    RBC 5.12 09/04/2014 10:25 AM    HEMOGLOBIN 15.5 " 09/04/2014 10:25 AM    HEMATOCRIT 46.1 09/04/2014 10:25 AM    MCV 90.0 09/04/2014 10:25 AM    MCH 30.3 09/04/2014 10:25 AM    MCHC 33.6 09/04/2014 10:25 AM    MPV 10.0 09/04/2014 10:25 AM    NEUTSPOLYS 54.0 09/04/2014 10:25 AM    LYMPHOCYTES 36.6 09/04/2014 10:25 AM    MONOCYTES 6.6 09/04/2014 10:25 AM    EOSINOPHILS 1.6 09/04/2014 10:25 AM    BASOPHILS 0.9 09/04/2014 10:25 AM      Imaging     None    Assessment and Plan     Jessica Sorensen is a 68 y.o. female    1. IFG (impaired fasting glucose)  Stable.  Discussed about risk to develop DM.   Advised low carb diet, exercise, watch for WT.   - Comp Metabolic Panel; Future  - HEMOGLOBIN A1C; Future    2. Dyslipidemia  Controlled, continue current treatment  - Comp Metabolic Panel; Future  - Lipid Profile; Future  - fenofibrate (TRIGLIDE) 160 MG tablet; Take 1 Tab by mouth every day.  Dispense: 90 Tab; Refill: 3    3. CKD (chronic kidney disease), stage III (HCC)  Improved, continue good fluid intake, avoid NSAIDs  - Comp Metabolic Panel; Future    4. Gastroesophageal reflux disease, esophagitis presence not specified  Controlled, continue current treatment    5. Leg swelling  Controlled with hydrochlorothiazide, continue current treatment  - hydrochlorothiazide (MICROZIDE) 12.5 MG capsule; Take 1 Cap by mouth every day.  Dispense: 90 Cap; Refill: 1    6.Postmenopausal HRT (hormone replacement therapy)  Control symptoms, continue current treatment    7. Health care maintenance  8. Encounter for screening mammogram for malignant neoplasm of breast  - MA-SCREEN MAMMO W/CAD-BILAT; Future  9. Need for hepatitis C screening test  - HEP C VIRUS ANTIBODY; Future    Counseling:   - Smoking:  Nonsmoker    Followup: Return in about 4 months (around 12/27/2019).    All questions are answered.    Please note that this dictation was created using voice recognition software, and that there might be errors of robert and possibly content.

## 2019-09-14 ENCOUNTER — HOSPITAL ENCOUNTER (OUTPATIENT)
Dept: RADIOLOGY | Facility: MEDICAL CENTER | Age: 69
End: 2019-09-14
Attending: INTERNAL MEDICINE
Payer: MEDICARE

## 2019-09-14 DIAGNOSIS — Z12.31 ENCOUNTER FOR SCREENING MAMMOGRAM FOR MALIGNANT NEOPLASM OF BREAST: ICD-10-CM

## 2019-09-14 PROCEDURE — 77063 BREAST TOMOSYNTHESIS BI: CPT

## 2019-09-16 ENCOUNTER — TELEPHONE (OUTPATIENT)
Dept: MEDICAL GROUP | Facility: MEDICAL CENTER | Age: 69
End: 2019-09-16

## 2019-09-16 NOTE — TELEPHONE ENCOUNTER
----- Message from Chad Washburn M.D. sent at 9/16/2019  3:09 PM PDT -----  Regarding: lab results  Please notify pt that labs are similar, to be repeated before the next OV in 12/19,  Thanks,  Dr Hutton

## 2019-09-16 NOTE — TELEPHONE ENCOUNTER
Phone Number Called: [phone] (home)      Call outcome: left message for patient to call back regarding message below    Message: Left message for patient about the message below.

## 2019-11-26 ENCOUNTER — TELEPHONE (OUTPATIENT)
Dept: MEDICAL GROUP | Facility: MEDICAL CENTER | Age: 69
End: 2019-11-26

## 2019-11-26 NOTE — TELEPHONE ENCOUNTER
Future Appointments       Provider Department Center    12/4/2019 8:00 AM Chad Washburn M.D.; [facility]          ANNUAL WELLNESS VISIT PRE-VISIT PLANNING WITHOUT OUTREACH    1.  Reviewed note from last office visit with PCP: YES    2.  If any orders were placed at last visit, do we have Results/Consult Notes?        •  Labs - Labs ordered, NOT completed. Patient advised to complete prior to next appointment.  Note: If patient appointment is for lab review and patient did not complete labs, check with provider if OK to reschedule patient until labs completed.       •  Imaging - Imaging ordered, completed and results are in chart.       •  Referrals - Referral ordered, patient was seen and consult notes are in chart. Care Teams updated  NO.    3.  Immunizations were updated in [employer] using WebIZ?: Yes       •  WebIZ Recommendations: FLU, PREVNAR (PCV13)  and SHINGRIX (Shingles)        •  Is patient due for Tdap? NO       •  Is patient due for Shingrix? YES. Patient was notified of copay/out of pocket cost.     4.  Patient is due for the following Health Maintenance Topics:   Health Maintenance Due   Topic Date Due   • HEPATITIS C SCREENING  1950   • IMM ZOSTER VACCINES (1 of 2) 09/29/1998   • IMM PNEUMOCOCCAL VACCINE: 65+ Years (1 of 2 - PCV13) 09/29/2013   • IMM INFLUENZA (1) 09/01/2019       - Patient already has appointment scheduled for Annual Wellness Visit (AWV).    5.  Reviewed/Updated the following with patient:       •   Preferred Pharmacy? YES       •   Preferred Lab? YES       •   Preferred Communication? YES       •   Allergies? YES       •   Medications? YES. Was Abstract Encounter opened and chart updated? YES       •   Social History? YES. Was Abstract Encounter opened and chart updated? YES       •   Family History (document living status of immediate family members and if + hx of  cancer, diabetes, hypertension,  hyperlipidemia, heart attack, stroke) YES. Was Abstract Encounter opened and chart updated? YES    6.  Care Team Updated:       •   DME Company (gait device, O2, CPAP, etc.): YES       •   Other Specialists (eye doctor, derm, GYN, cardiology, endo, etc): YES    7. Orders for overdue Health Maintenance topics pended in Pre-Charting? NO    8.  Patient has the following Care Path diagnoses on Problem List:  NONE    9.  Patient was advised: “This is a free wellness visit. The provider will screen for medical conditions to help you stay healthy. If you have other concerns to address you may be asked to discuss these at a separate visit or there may be an additional fee.”     10.  Patient was informed to arrive 15 min prior to their scheduled appointment and bring in their medication bottles.

## 2019-12-04 ENCOUNTER — TELEPHONE (OUTPATIENT)
Dept: MEDICAL GROUP | Facility: MEDICAL CENTER | Age: 69
End: 2019-12-04

## 2019-12-04 ENCOUNTER — HOSPITAL ENCOUNTER (OUTPATIENT)
Facility: MEDICAL CENTER | Age: 69
End: 2019-12-04
Attending: INTERNAL MEDICINE
Payer: MEDICARE

## 2019-12-04 ENCOUNTER — OFFICE VISIT (OUTPATIENT)
Dept: MEDICAL GROUP | Facility: MEDICAL CENTER | Age: 69
End: 2019-12-04
Payer: MEDICARE

## 2019-12-04 VITALS
DIASTOLIC BLOOD PRESSURE: 76 MMHG | HEART RATE: 79 BPM | HEIGHT: 65 IN | WEIGHT: 168 LBS | BODY MASS INDEX: 27.99 KG/M2 | SYSTOLIC BLOOD PRESSURE: 108 MMHG | OXYGEN SATURATION: 96 % | TEMPERATURE: 98.2 F

## 2019-12-04 DIAGNOSIS — E78.5 DYSLIPIDEMIA: ICD-10-CM

## 2019-12-04 DIAGNOSIS — N18.30 CKD (CHRONIC KIDNEY DISEASE), STAGE III (HCC): ICD-10-CM

## 2019-12-04 DIAGNOSIS — M79.89 LEG SWELLING: ICD-10-CM

## 2019-12-04 DIAGNOSIS — Z00.00 HEALTH CARE MAINTENANCE: ICD-10-CM

## 2019-12-04 DIAGNOSIS — N30.00 ACUTE CYSTITIS WITHOUT HEMATURIA: ICD-10-CM

## 2019-12-04 DIAGNOSIS — Z23 NEED FOR VACCINATION: ICD-10-CM

## 2019-12-04 DIAGNOSIS — E55.9 HYPOVITAMINOSIS D: ICD-10-CM

## 2019-12-04 DIAGNOSIS — Z79.890 POSTMENOPAUSAL HRT (HORMONE REPLACEMENT THERAPY): ICD-10-CM

## 2019-12-04 DIAGNOSIS — Z00.00 MEDICARE ANNUAL WELLNESS VISIT, SUBSEQUENT: ICD-10-CM

## 2019-12-04 DIAGNOSIS — K21.9 GASTROESOPHAGEAL REFLUX DISEASE, ESOPHAGITIS PRESENCE NOT SPECIFIED: ICD-10-CM

## 2019-12-04 DIAGNOSIS — R10.2 PELVIC PAIN: ICD-10-CM

## 2019-12-04 DIAGNOSIS — R73.01 IFG (IMPAIRED FASTING GLUCOSE): ICD-10-CM

## 2019-12-04 LAB
APPEARANCE UR: NORMAL
BILIRUB UR STRIP-MCNC: NORMAL MG/DL
COLOR UR AUTO: NORMAL
GLUCOSE UR STRIP.AUTO-MCNC: NORMAL MG/DL
KETONES UR STRIP.AUTO-MCNC: NORMAL MG/DL
LEUKOCYTE ESTERASE UR QL STRIP.AUTO: NORMAL
NITRITE UR QL STRIP.AUTO: NORMAL
PH UR STRIP.AUTO: 5.5 [PH] (ref 5–8)
PROT UR QL STRIP: NORMAL MG/DL
RBC UR QL AUTO: NORMAL
SP GR UR STRIP.AUTO: 1.03
UROBILINOGEN UR STRIP-MCNC: 0.2 MG/DL

## 2019-12-04 PROCEDURE — G0439 PPPS, SUBSEQ VISIT: HCPCS | Mod: 25 | Performed by: INTERNAL MEDICINE

## 2019-12-04 PROCEDURE — 87077 CULTURE AEROBIC IDENTIFY: CPT

## 2019-12-04 PROCEDURE — 87186 SC STD MICRODIL/AGAR DIL: CPT

## 2019-12-04 PROCEDURE — 90670 PCV13 VACCINE IM: CPT | Performed by: INTERNAL MEDICINE

## 2019-12-04 PROCEDURE — 99214 OFFICE O/P EST MOD 30 MIN: CPT | Mod: 25 | Performed by: INTERNAL MEDICINE

## 2019-12-04 PROCEDURE — 90662 IIV NO PRSV INCREASED AG IM: CPT | Performed by: INTERNAL MEDICINE

## 2019-12-04 PROCEDURE — G0009 ADMIN PNEUMOCOCCAL VACCINE: HCPCS | Performed by: INTERNAL MEDICINE

## 2019-12-04 PROCEDURE — 87086 URINE CULTURE/COLONY COUNT: CPT

## 2019-12-04 PROCEDURE — G0008 ADMIN INFLUENZA VIRUS VAC: HCPCS | Performed by: INTERNAL MEDICINE

## 2019-12-04 PROCEDURE — 81002 URINALYSIS NONAUTO W/O SCOPE: CPT | Performed by: INTERNAL MEDICINE

## 2019-12-04 RX ORDER — CIPROFLOXACIN 500 MG/1
500 TABLET, FILM COATED ORAL 2 TIMES DAILY
Qty: 10 TAB | Refills: 0 | Status: SHIPPED | OUTPATIENT
Start: 2019-12-04 | End: 2020-12-31

## 2019-12-04 RX ORDER — FENOFIBRATE 160 MG/1
160 TABLET ORAL
Qty: 90 TAB | Refills: 3 | Status: SHIPPED | OUTPATIENT
Start: 2019-12-04 | End: 2022-07-05

## 2019-12-04 RX ORDER — OMEPRAZOLE 20 MG/1
CAPSULE, DELAYED RELEASE ORAL
Qty: 90 CAP | Refills: 3 | Status: SHIPPED | OUTPATIENT
Start: 2019-12-04 | End: 2020-12-31

## 2019-12-04 RX ORDER — HYDROCHLOROTHIAZIDE 12.5 MG/1
12.5 CAPSULE, GELATIN COATED ORAL DAILY
Qty: 90 CAP | Refills: 1 | Status: SHIPPED | OUTPATIENT
Start: 2019-12-04 | End: 2020-12-31

## 2019-12-04 RX ORDER — ATORVASTATIN CALCIUM 40 MG/1
40 TABLET, FILM COATED ORAL DAILY
Qty: 90 TAB | Refills: 1 | Status: SHIPPED | OUTPATIENT
Start: 2019-12-04 | End: 2020-12-31

## 2019-12-04 ASSESSMENT — PATIENT HEALTH QUESTIONNAIRE - PHQ9: CLINICAL INTERPRETATION OF PHQ2 SCORE: 0

## 2019-12-04 ASSESSMENT — ACTIVITIES OF DAILY LIVING (ADL): BATHING_REQUIRES_ASSISTANCE: 0

## 2019-12-04 ASSESSMENT — ENCOUNTER SYMPTOMS: GENERAL WELL-BEING: EXCELLENT

## 2019-12-04 NOTE — PROGRESS NOTES
Chief Complaint   Patient presents with   • Annual Wellness Visit   Pelvic pain, labs    HPI:  Khanh is a 69 y.o. here for Medicare Annual Wellness Visit    IFG  The patient had elevated hemoglobin A1c and fasting blood sugar:   No abdominal pain, weight loss, fatigue.  Diet: like sweets  Exercise: Daily   BMI: 27  Family history diabetes: Father     Dyslipidemia  The patient was on pravastatin 40 mg QD.  No myalgias, muscle cramps or pain.   Diet/exercise/BMI: As above  FH: mother     CKD stage III  The patient had decreased GFR, with normal creatinine and electrolytes.  No SOB, palpitations, peripheral swelling, change in urinary habits.   NSAIDs use: Almost daily  Fluid intake: insufficient    LE swelling  The patient has had LE swelling, controlled with low-dose HCTZ, 12.5 mg daily.     GERD  On omeprazole, 20 mg QD; takes medication as prescribed, that controls sx.   Denies:   - heartburn, epigastric pain.   - nausea, vomiting, or diarrhea  - dysphagia  - abnormal cough  - blood in the stool or dark tarry stools.  - early satiety  - tobacco use.     HRT  The patient has been on estradiol daily, needs a refill.   Denies hot flushes, sweating, vaginal dryness, mood swings.     Hypovitaminosis D  The patient had low vitamin D level.  Vitamin D supplement: OTC.    Pelvic pain, constipation  69-year-old, female, with history of constipation complains of pelvic pain.    Onset: 4 days ago, suprapubic   Quality: Spasms, intermittent.    Sx:   · Dysuria  · Frequency  · Urgency  · Suprapubic discomfort  · Abdominal/flank pain  · Fever, chills  · Urine color/odor change    Patient Active Problem List    Diagnosis Date Noted   • Leg swelling 08/27/2019   • Gastroesophageal reflux disease 08/27/2019   • IFG (impaired fasting glucose) 01/02/2019   • CKD (chronic kidney disease), stage III (HCC) 01/02/2019   • Health care maintenance 12/03/2015   • Postmenopausal HRT (hormone replacement therapy) 06/25/2015   • Dyslipidemia  06/02/2015       Current Outpatient Medications   Medication Sig Dispense Refill   • hydrochlorothiazide (MICROZIDE) 12.5 MG capsule Take 1 Cap by mouth every day. 90 Cap 1   • atorvastatin (LIPITOR) 40 MG Tab Take 1 Tab by mouth every day. 90 Tab 1   • fenofibrate (TRIGLIDE) 160 MG tablet Take 1 Tab by mouth every day. 90 Tab 3   • estradiol (ESTRACE) 2 MG Tab Take 1 Tab by mouth every day. 90 Tab 3   • omeprazole (PRILOSEC) 20 MG delayed-release capsule TAKE 1 CAPSULE DAILY (Patient not taking: Reported on 12/4/2019) 90 Cap 3     No current facility-administered medications for this visit.       Patient is taking medications as noted in medication list.  Current supplements as per medication list.     Allergies: Neosporin pain relieving    Current social contact/activities: Patient reports nothing     Is patient current with immunizations? No, due for FLU, PREVNAR (PCV13)  and SHINGRIX (Shingles). Patient is interested in receiving FLU and PREVNAR (PCV13)  today.    She  reports that she has never smoked. She has never used smokeless tobacco. She reports current alcohol use of about 0.6 oz of alcohol per week. She reports that she does not use drugs.  Counseling given: Not Answered    DPA/Advanced directive: Patient does not have an Advanced Directive.  A packet and workshop information was given on Advanced Directives.    ROS:    Gait: Uses no assistive device   Ostomy: No   Other tubes: No   Amputations: No   Chronic oxygen use No   Last eye exam Patient reports about 1 year   Wears hearing aids: No   : Denies any urinary leakage during the last 6 months    Depression Screening  Little interest or pleasure in doing things?  0 - not at all  Feeling down, depressed, or hopeless? 0 - not at all  Patient Health Questionnaire Score: 0    Screening for Cognitive Impairment  Three Minute Recall (village, kitchen, baby)  0/3    Christopher clock face with all 12 numbers and set the hands to show 10 past 10.  No 0/5  If  cognitive concerns identified, deferred for follow up unless specifically addressed in assessment and plan.    Fall Risk Assessment  Has the patient had two or more falls in the last year or any fall with injury in the last year?  No  If fall risk identified, deferred for follow up unless specifically addressed in assessment and plan.    Safety Assessment  Throw rugs on floor.  Yes  Handrails on all stairs.  Yes  Good lighting in all hallways.  Yes  Difficulty hearing.  No  Patient counseled about all safety risks that were identified.    Functional Assessment ADLs  Are there any barriers preventing you from cooking for yourself or meeting nutritional needs?  No.    Are there any barriers preventing you from driving safely or obtaining transportation?  No.    Are there any barriers preventing you from using a telephone or calling for help?  No.    Are there any barriers preventing you from shopping?  No.    Are there any barriers preventing you from taking care of your own finances?  No.    Are there any barriers preventing you from managing your medications?  No.    Are there any barriers preventing you from showering, bathing or dressing yourself?  No.    Are you currently engaging in any exercise or physical activity?  Yes.  Patient reports going to the gym once per week   What is your perception of your health?  Excellent.    Health Maintenance Summary                HEPATITIS C SCREENING Overdue 1950     IMM ZOSTER VACCINES Overdue 9/29/1998     IMM PNEUMOCOCCAL VACCINE: 65+ Years Overdue 9/29/2013     IMM INFLUENZA Overdue 9/1/2019      Done 11/18/2010 Imm Admin: Influenza TIV (IM)    BONE DENSITY Next Due 6/2/2020      Postponed 6/2/2015      Patient has more history with this topic...    MAMMOGRAM Next Due 9/14/2020      Done 9/14/2019 MA-SCREENING MAMMO BILAT W/TOMOSYNTHESIS W/CAD     Patient has more history with this topic...    IMM DTaP/Tdap/Td Vaccine Next Due 9/8/2024      Done 9/8/2014 Imm Admin:  "Tdap Vaccine    COLONOSCOPY Next Due 6/2/2025      Postponed 6/2/2015         Patient Care Team:  Chad Washburn M.D. as PCP - General (Family Medicine)  Dr. Bill Carranza as Consulting Physician (Ophthalmology)  Bill Waddell D.P.M. as Consulting Physician (Podiatry)    Social History     Tobacco Use   • Smoking status: Never Smoker   • Smokeless tobacco: Never Used   Substance Use Topics   • Alcohol use: Yes     Alcohol/week: 0.6 oz     Types: 1 Standard drinks or equivalent per week   • Drug use: Never     Family History   Problem Relation Age of Onset   • Cancer Mother         colon   • Heart Disease Mother    • Hypertension Mother    • Hyperlipidemia Mother    • Diabetes Father    • Psychiatric Illness Neg Hx    • Stroke Neg Hx    • Thyroid Neg Hx      She  has a past medical history of Anxiety, Dyslipidemia, Hypovitaminosis D, Memory problem, Neck pain, and Panic attack. She also has no past medical history of Asthma, Chronic airway obstruction, not elsewhere classified, or Type II or unspecified type diabetes mellitus without mention of complication, not stated as uncontrolled.   Past Surgical History:   Procedure Laterality Date   • CERVICAL FUSION POSTERIOR  3/23/2009    Performed by ANYI GORDON at SURGERY [facility] ORS     Exam:   /76   Pulse 79   Temp 36.8 °C (98.2 °F) (Temporal)   Ht 1.651 m (5' 5\")   Wt 76.2 kg (168 lb)   SpO2 96%  Body mass index is 27.96 kg/m².  Hearing good.    Dentition fair  Alert, oriented in no acute distress.  Eye contact is good, speech goal directed, affect calm    Labs  Reviewed and discussed  Lab Results   Component Value Date/Time    CHOLSTRLTOT 170 08/17/2019 07:28 AM    LDL 93 08/17/2019 07:28 AM    HDL 48 08/17/2019 07:28 AM    TRIGLYCERIDE 144 08/17/2019 07:28 AM       Lab Results   Component Value Date/Time    SODIUM 142 08/17/2019 07:28 AM    POTASSIUM 4.2 08/17/2019 07:28 AM    CHLORIDE 110 08/17/2019 07:28 AM    CO2 25 08/17/2019 " 07:28 AM    GLUCOSE 99 08/17/2019 07:28 AM    BUN 18 08/17/2019 07:28 AM    CREATININE 0.96 08/17/2019 07:28 AM     Lab Results   Component Value Date/Time    ALKPHOSPHAT 59 08/17/2019 07:28 AM    ASTSGOT 15 08/17/2019 07:28 AM    ALTSGPT 14 08/17/2019 07:28 AM    TBILIRUBIN 0.6 08/17/2019 07:28 AM      Lab Results   Component Value Date/Time    HBA1C 6.1 (H) 08/17/2019 07:28 AM    HBA1C 6.1 (H) 02/06/2019 06:13 AM    HBA1C 6.4 (H) 01/02/2015 07:59 AM     No results found for: TSH  Lab Results   Component Value Date/Time    FREET4 0.67 02/06/2019 06:13 AM     Lab Results   Component Value Date/Time    WBC 9.1 09/04/2014 10:25 AM    RBC 5.12 09/04/2014 10:25 AM    HEMOGLOBIN 15.5 09/04/2014 10:25 AM    HEMATOCRIT 46.1 09/04/2014 10:25 AM    MCV 90.0 09/04/2014 10:25 AM    MCH 30.3 09/04/2014 10:25 AM    MCHC 33.6 09/04/2014 10:25 AM    MPV 10.0 09/04/2014 10:25 AM    NEUTSPOLYS 54.0 09/04/2014 10:25 AM    LYMPHOCYTES 36.6 09/04/2014 10:25 AM    MONOCYTES 6.6 09/04/2014 10:25 AM    EOSINOPHILS 1.6 09/04/2014 10:25 AM    BASOPHILS 0.9 09/04/2014 10:25 AM      Assessment and Plan    1. Medicare annual wellness visit, subsequent  Reviewed PMH, PSH, FH, SH, ALL, MEDS, HCM/IMM.     2. Need for vaccination  Information was provided to the patient regarding the vaccine, including side effects. Vaccine was given by my medical assistant under my supervision.  - Prevnar-13 Vaccine (PCV13)  - Influenza Vaccine, High Dose (65+ Only)    3. Health care maintenance  Per HPI    4. IFG (impaired fasting glucose)  Discussed about risk to develop DM.   Advised low carb diet, exercise, watch for WT.   - Comp Metabolic Panel; Future  - HEMOGLOBIN A1C; Future    5. Dyslipidemia  Controlled, continue current treatment  - Comp Metabolic Panel; Future  - Lipid Profile; Future  - atorvastatin (LIPITOR) 40 MG Tab; Take 1 Tab by mouth every day.  Dispense: 90 Tab; Refill: 1  - fenofibrate (TRIGLIDE) 160 MG tablet; Take 1 Tab by mouth every day.   Dispense: 90 Tab; Refill: 3    6. CKD (chronic kidney disease), stage III (HCC)  Stable, advised to continue good fluid intake and avoid NSAIDs  - Comp Metabolic Panel; Future    7. Leg swelling  Controlled, continue current treatment  - hydrochlorothiazide (MICROZIDE) 12.5 MG capsule; Take 1 Cap by mouth every day.  Dispense: 90 Cap; Refill: 1    8. Gastroesophageal reflux disease, esophagitis presence not specified  Controlled, continue current treatment    9. Postmenopausal HRT (hormone replacement therapy)  Controlled, continue current treatment    10. Hypovitaminosis D  Advised 2000 units of vitamin D daily, OTC  - VITAMIN D,25 HYDROXY; Future    11. Pelvic pain  Urinalysis was positive, she is given ciprofloxacin, pending culture  - POCT Urinalysis  - US-RENAL; Future  12. Acute cystitis without hematuria  - URINE CULTURE(NEW); Future  - ciprofloxacin (CIPRO) 500 MG Tab; Take 1 Tab by mouth 2 times a day.  Dispense: 10 Tab; Refill: 0    Services suggested: No services needed at this time  Health Care Screening recommendations as per orders if indicated.  Referrals offered: PT/OT/Nutrition counseling/Behavioral Health/Smoking cessation as per orders if indicated.    Discussion today about general wellness and lifestyle habits:    · Prevent falls and reduce trip hazards; Cautioned about securing or removing rugs.  · Have a working fire alarm and carbon monoxide detector;   · Engage in regular physical activity and social activities     Follow-up: in 3 months and prn

## 2019-12-04 NOTE — TELEPHONE ENCOUNTER
Phone Number Called: [phone] (home)       Call outcome: spoke to patient regarding message below    Message: Spoke to Patients  and advised That Khanh does have a UTI and a prescription was sent over to pharmacy.     Nolan Carrizales, Med Ass't

## 2019-12-06 LAB
BACTERIA UR CULT: ABNORMAL
BACTERIA UR CULT: ABNORMAL
SIGNIFICANT IND 70042: ABNORMAL
SITE SITE: ABNORMAL
SOURCE SOURCE: ABNORMAL

## 2019-12-07 ENCOUNTER — HOSPITAL ENCOUNTER (OUTPATIENT)
Dept: RADIOLOGY | Facility: MEDICAL CENTER | Age: 69
End: 2019-12-07
Attending: INTERNAL MEDICINE
Payer: MEDICARE

## 2019-12-07 DIAGNOSIS — R10.2 PELVIC PAIN: ICD-10-CM

## 2019-12-07 PROCEDURE — 76775 US EXAM ABDO BACK WALL LIM: CPT

## 2019-12-21 ENCOUNTER — HOSPITAL ENCOUNTER (OUTPATIENT)
Dept: LAB | Facility: MEDICAL CENTER | Age: 69
End: 2019-12-21
Attending: INTERNAL MEDICINE
Payer: MEDICARE

## 2019-12-21 DIAGNOSIS — R73.01 IFG (IMPAIRED FASTING GLUCOSE): ICD-10-CM

## 2019-12-21 DIAGNOSIS — N18.30 CKD (CHRONIC KIDNEY DISEASE), STAGE III (HCC): ICD-10-CM

## 2019-12-21 DIAGNOSIS — Z11.59 NEED FOR HEPATITIS C SCREENING TEST: ICD-10-CM

## 2019-12-21 DIAGNOSIS — E78.5 DYSLIPIDEMIA: ICD-10-CM

## 2019-12-21 LAB
ALBUMIN SERPL BCP-MCNC: 4.1 G/DL (ref 3.2–4.9)
ALBUMIN/GLOB SERPL: 1.6 G/DL
ALP SERPL-CCNC: 56 U/L (ref 30–99)
ALT SERPL-CCNC: 13 U/L (ref 2–50)
ANION GAP SERPL CALC-SCNC: 7 MMOL/L (ref 0–11.9)
AST SERPL-CCNC: 16 U/L (ref 12–45)
BILIRUB SERPL-MCNC: 0.5 MG/DL (ref 0.1–1.5)
BUN SERPL-MCNC: 13 MG/DL (ref 8–22)
CALCIUM SERPL-MCNC: 8.9 MG/DL (ref 8.5–10.5)
CHLORIDE SERPL-SCNC: 110 MMOL/L (ref 96–112)
CHOLEST SERPL-MCNC: 225 MG/DL (ref 100–199)
CO2 SERPL-SCNC: 25 MMOL/L (ref 20–33)
CREAT SERPL-MCNC: 1.04 MG/DL (ref 0.5–1.4)
EST. AVERAGE GLUCOSE BLD GHB EST-MCNC: 137 MG/DL
FASTING STATUS PATIENT QL REPORTED: NORMAL
GLOBULIN SER CALC-MCNC: 2.6 G/DL (ref 1.9–3.5)
GLUCOSE SERPL-MCNC: 86 MG/DL (ref 65–99)
HBA1C MFR BLD: 6.4 % (ref 0–5.6)
HCV AB SER QL: NEGATIVE
HDLC SERPL-MCNC: 49 MG/DL
LDLC SERPL CALC-MCNC: 139 MG/DL
POTASSIUM SERPL-SCNC: 4.4 MMOL/L (ref 3.6–5.5)
PROT SERPL-MCNC: 6.7 G/DL (ref 6–8.2)
SODIUM SERPL-SCNC: 142 MMOL/L (ref 135–145)
TRIGL SERPL-MCNC: 185 MG/DL (ref 0–149)

## 2019-12-21 PROCEDURE — 80061 LIPID PANEL: CPT

## 2019-12-21 PROCEDURE — 83036 HEMOGLOBIN GLYCOSYLATED A1C: CPT

## 2019-12-21 PROCEDURE — 36415 COLL VENOUS BLD VENIPUNCTURE: CPT

## 2019-12-21 PROCEDURE — 80053 COMPREHEN METABOLIC PANEL: CPT

## 2019-12-21 PROCEDURE — 86803 HEPATITIS C AB TEST: CPT

## 2019-12-24 ENCOUNTER — TELEPHONE (OUTPATIENT)
Dept: MEDICAL GROUP | Facility: MEDICAL CENTER | Age: 69
End: 2019-12-24

## 2019-12-24 NOTE — TELEPHONE ENCOUNTER
Called asked for Khanh spoke to a janet said he would have Khanh call back.----- Message from Chad Washburn M.D. sent at 12/23/2019  8:27 AM PST -----  Please notify patient that labs are similar, to repeat before next appointment, thank you, Dr. Hutton

## 2020-03-13 ENCOUNTER — TELEPHONE (OUTPATIENT)
Dept: MEDICAL GROUP | Age: 70
End: 2020-03-13

## 2020-03-13 NOTE — TELEPHONE ENCOUNTER
ESTABLISHED PATIENT PRE-VISIT PLANNING     Patient was NOT contacted to complete PVP.     Note: Patient will not be contacted if there is no indication to call.     1.  Reviewed notes from the last few office visits within the medical group: Yes    2.  If any orders were placed at last visit or intended to be done for this visit (i.e. 6 mos follow-up), do we have Results/Consult Notes?        •  Labs - Labs ordered, completed on 12/21/19 and results are in chart.   Note: If patient appointment is for lab review and patient did not complete labs, check with provider if OK to reschedule patient until labs completed.       •  Imaging - Imaging was not ordered at last office visit.       •  Referrals - No referrals were ordered at last office visit.    3. Is this appointment scheduled as a Hospital Follow-Up? No    4.  Immunizations were updated in Epic using WebIZ?: Epic matches WebIZ       •  Web Iz Recommendations: SHINGRIX (Shingles)    5.  Patient is due for the following Health Maintenance Topics:   Health Maintenance Due   Topic Date Due   • IMM ZOSTER VACCINES (1 of 2) 09/29/1998           6. Orders for overdue Health Maintenance topics pended in Pre-Charting? N\A    7.  AHA (MDX) form printed for Provider? YES    8.  Patient was NOT informed to arrive 15 min prior to their scheduled appointment and bring in their medication bottles.

## 2020-06-24 ENCOUNTER — PATIENT OUTREACH (OUTPATIENT)
Dept: HEALTH INFORMATION MANAGEMENT | Facility: OTHER | Age: 70
End: 2020-06-24

## 2020-06-24 NOTE — PROGRESS NOTES
Outcome: no answer no VM    Please transfer to Patient Outreach Team at [phone] when patient returns call.      HealthConnect Verified: yes    Attempt # 1

## 2020-07-15 NOTE — PROGRESS NOTES
Called and spoke with  he stated appointment was not needed at this time and will call back to schedule when ready.

## 2020-07-29 ENCOUNTER — PATIENT OUTREACH (OUTPATIENT)
Dept: HEALTH INFORMATION MANAGEMENT | Facility: OTHER | Age: 70
End: 2020-07-29

## 2020-07-29 NOTE — PROGRESS NOTES
1. HealthConnect Verified: yes    2. Verify PCP: yes    3. Review and add  to Care Team: no      5. Reviewed/Updated the following with patient:       •   Communication Preference Obtained? YES  • MyChart Activation: already active       •   E-Mail Address Obtained? YES       •   Appointment Day and Time Preferences? YES       •   Preferred Pharmacy? YES       •   Preferred Lab? YES    Spoke with mbjamsin and spouse Paul, giovana gave verbal consent to speak with Paul. Introduced the SCP PA to both as they were both in the call. Advised I would mail out HIPAA form. Paul stated they received a letter from [facility] regarding a payment, I adv that letter was regarding A premium payment in the amount of $16.90 will be due by September 30, 2020 in order to  remain enrolled with [facility]. He requested premium to be taken out directly from [employer] and mailed out payment change form.

## 2020-12-30 RX ORDER — IBUPROFEN 800 MG/1
800 TABLET ORAL PRN
COMMUNITY
End: 2021-06-22

## 2020-12-30 SDOH — HEALTH STABILITY: MENTAL HEALTH: HOW OFTEN DO YOU HAVE A DRINK CONTAINING ALCOHOL?: MONTHLY OR LESS

## 2020-12-30 SDOH — HEALTH STABILITY: MENTAL HEALTH: HOW OFTEN DO YOU HAVE 6 OR MORE DRINKS ON ONE OCCASION?: NEVER

## 2020-12-30 SDOH — HEALTH STABILITY: MENTAL HEALTH: HOW MANY STANDARD DRINKS CONTAINING ALCOHOL DO YOU HAVE ON A TYPICAL DAY?: 1 OR 2

## 2020-12-30 NOTE — PROGRESS NOTES
ESTABLISHED PATIENT PRE-VISIT PLANNING     12/30/2020@8:05AM Called & spoke to patient's spouse Paul who is listed in patient's Demographics with permissions to discuss both billing and treatment. Spouse ok'd AWV add-on. Offered VV-Declined. Advised AWV scheduled Thursday, 12/31/2020 @9:20AM -[address] [place] [address]. Emailed [employer] activation link.    Patient was contacted to complete PVP.     Note: Patient will not be contacted if there is no indication to call.     1.  Reviewed notes from the last few office visits within the medical group: Yes    2.  If any orders were placed at last visit or intended to be done for this visit (i.e. 6 mos follow-up), do we have Results/Consult Notes?         •  Labs - Labs were not ordered at last office visit.  Note: If patient appointment is for lab review and patient did not complete labs, check with provider if OK to reschedule patient until labs completed.       •  Imaging - Imaging was not ordered at last office visit.       •  Referrals - No referrals were ordered at last office visit.    3. Is this appointment scheduled as a Hospital Follow-Up? No    4.  Immunizations were updated in Epic using Reconcile Outside Information activity? Yes    5.  Patient is due for the following Health Maintenance Topics:   Health Maintenance Due   Topic Date Due   • IMM ZOSTER VACCINES (1 of 2) 09/29/1998   • BONE DENSITY  06/02/2020   • IMM INFLUENZA (1) 09/01/2020   • MAMMOGRAM  09/14/2020   • Annual Wellness Visit  12/04/2020   • IMM PNEUMOCOCCAL VACCINE: 65+ Years (2 of 2 - PPSV23) 12/04/2020       6.  AHA (Pulse8) form printed for Provider? Yes

## 2020-12-31 ENCOUNTER — OFFICE VISIT (OUTPATIENT)
Dept: MEDICAL GROUP | Age: 70
End: 2020-12-31
Payer: MEDICARE

## 2020-12-31 VITALS
TEMPERATURE: 98.4 F | HEIGHT: 67 IN | BODY MASS INDEX: 27.69 KG/M2 | DIASTOLIC BLOOD PRESSURE: 64 MMHG | OXYGEN SATURATION: 96 % | SYSTOLIC BLOOD PRESSURE: 110 MMHG | WEIGHT: 176.4 LBS | HEART RATE: 76 BPM

## 2020-12-31 DIAGNOSIS — Z00.00 MEDICARE ANNUAL WELLNESS VISIT, SUBSEQUENT: ICD-10-CM

## 2020-12-31 DIAGNOSIS — Z23 NEED FOR VACCINATION: ICD-10-CM

## 2020-12-31 DIAGNOSIS — Z79.890 POSTMENOPAUSAL HRT (HORMONE REPLACEMENT THERAPY): ICD-10-CM

## 2020-12-31 DIAGNOSIS — R53.83 FATIGUE, UNSPECIFIED TYPE: ICD-10-CM

## 2020-12-31 DIAGNOSIS — Z78.0 POSTMENOPAUSAL STATUS (AGE-RELATED) (NATURAL): ICD-10-CM

## 2020-12-31 DIAGNOSIS — N18.30 STAGE 3 CHRONIC KIDNEY DISEASE, UNSPECIFIED WHETHER STAGE 3A OR 3B CKD: ICD-10-CM

## 2020-12-31 DIAGNOSIS — E55.9 HYPOVITAMINOSIS D: ICD-10-CM

## 2020-12-31 DIAGNOSIS — Z00.00 HEALTH CARE MAINTENANCE: ICD-10-CM

## 2020-12-31 DIAGNOSIS — M25.562 LEFT KNEE PAIN, UNSPECIFIED CHRONICITY: ICD-10-CM

## 2020-12-31 DIAGNOSIS — E78.5 DYSLIPIDEMIA: ICD-10-CM

## 2020-12-31 DIAGNOSIS — Z12.31 ENCOUNTER FOR SCREENING MAMMOGRAM FOR BREAST CANCER: ICD-10-CM

## 2020-12-31 DIAGNOSIS — N95.1 MENOPAUSAL STATE: ICD-10-CM

## 2020-12-31 DIAGNOSIS — R73.01 IMPAIRED FASTING GLUCOSE: ICD-10-CM

## 2020-12-31 PROCEDURE — 99213 OFFICE O/P EST LOW 20 MIN: CPT | Mod: 25 | Performed by: INTERNAL MEDICINE

## 2020-12-31 PROCEDURE — G0439 PPPS, SUBSEQ VISIT: HCPCS | Performed by: INTERNAL MEDICINE

## 2020-12-31 RX ORDER — ESTRADIOL 2 MG/1
2 TABLET ORAL
Qty: 90 TAB | Refills: 3 | Status: SHIPPED | OUTPATIENT
Start: 2020-12-31 | End: 2022-08-21

## 2020-12-31 ASSESSMENT — ACTIVITIES OF DAILY LIVING (ADL): BATHING_REQUIRES_ASSISTANCE: 0

## 2020-12-31 ASSESSMENT — ENCOUNTER SYMPTOMS: GENERAL WELL-BEING: GOOD

## 2020-12-31 ASSESSMENT — PAIN SCALES - GENERAL: PAINLEVEL: 4=SLIGHT-MODERATE PAIN

## 2020-12-31 ASSESSMENT — PATIENT HEALTH QUESTIONNAIRE - PHQ9: CLINICAL INTERPRETATION OF PHQ2 SCORE: 0

## 2020-12-31 NOTE — PROGRESS NOTES
Chief Complaint   Patient presents with   • Annual Wellness Visit     HPI:  Khanh is a 70 y.o. here for Medicare Annual Wellness Visit    IFG, fatigue  The patient had elevated hemoglobin A1c and fasting blood sugar:   Complains of fatigue.  No abdominal pain, weight loss, fatigue.  Diet: like sweets  Exercise: Daily physical activity  BMI: 28  Family history diabetes: Father     Dyslipidemia  The patient was on pravastatin 40 mg QD.  No myalgias, muscle cramps or pain.   Diet/exercise/BMI: As above  FH: mother     CKD stage III  The patient had decreased GFR, with normal creatinine and electrolytes.  No SOB, palpitations, peripheral swelling, change in urinary habits.   NSAIDs use: Almost daily  Fluid intake: insufficient      Hypovitaminosis D  The patient had low vitamin D level.  Vitamin D supplement: OTC.    HRT  The patient has been on estradiol daily, needs a refill.   Denies hot flushes, sweating, vaginal dryness, mood swings.     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  - located in: above the LT knee cup  - intensity:  mild to moderate  - quality:  dull and sharp  - radiation:  no  - alleviating factors are:  rest, ace wraps  -  exacerbating factors are:  activity  - accompanied: no numbness, weakness, tingling, fever, chills  - course: persistent  - imaging: pending  - treatment: as above    Patient Active Problem List    Diagnosis Date Noted   • Leg swelling 08/27/2019   • Gastroesophageal reflux disease 08/27/2019   • IFG (impaired fasting glucose) 01/02/2019   • CKD (chronic kidney disease), stage III 01/02/2019   • Health care maintenance 12/03/2015   • Postmenopausal HRT (hormone replacement therapy) 06/25/2015   • Dyslipidemia 06/02/2015     Current Outpatient Medications   Medication Sig Dispense Refill   • ibuprofen (MOTRIN) 800 MG Tab Take 800 mg by mouth as needed.     • hydrochlorothiazide (MICROZIDE) 12.5 MG capsule Take 1 Cap by mouth every day. 90 Cap 1   •  atorvastatin (LIPITOR) 40 MG Tab Take 1 Tab by mouth every day. 90 Tab 1   • fenofibrate (TRIGLIDE) 160 MG tablet Take 1 Tab by mouth every day. 90 Tab 3   • ciprofloxacin (CIPRO) 500 MG Tab Take 1 Tab by mouth 2 times a day. 10 Tab 0   • estradiol (ESTRACE) 2 MG Tab Take 1 Tab by mouth every day. 90 Tab 3   • omeprazole (PRILOSEC) 20 MG delayed-release capsule TAKE 1 CAPSULE DAILY (Patient not taking: Reported on 12/30/2020) 90 Cap 3     No current facility-administered medications for this visit.         Patient is taking medications as noted in medication list.  Current supplements as per medication list.     Allergies: Neosporin pain relieving    Current social contact/activities: Patient lives with spouse. Watch television, house hold chores: cleaning, cooking, and laundry. Limited Visit with daughter and mother in law during global pandemic COVID-19.     Is patient current with immunizations? No, due for FLU, PNEUMOVAX (PPSV23) and SHINGRIX (Shingles). Patient is interested in receiving FLU, PNEUMOVAX (PPSV23) and SHINGRIX (Shingles) today.    She  reports that she has never smoked. She has never used smokeless tobacco. She reports current alcohol use of about 0.6 oz of alcohol per week. She reports that she does not use drugs.  Counseling given: Not Answered    DPA/Advanced directive: Patient does not have an Advanced Directive.  A packet and workshop information was given on Advanced Directives.    ROS:    Gait: Uses no assistive device   Ostomy: No   Other tubes: No   Amputations: No   Chronic oxygen use No   Last eye exam: Year 2020  Wears hearing aids: No   : Denies any urinary leakage during the last 6 months    Screening:    Depression Screening  Little interest or pleasure in doing things?  0 - not at all  Feeling down, depressed, or hopeless? 0 - not at all  Patient Health Questionnaire Score: 0    Screening for Cognitive Impairment  Three Minute Recall (river, nation, finger)  0/3 River, Nation,  Finger  Christopher clock face with all 12 numbers and set the hands to show 10 past 11.  No 0/5  If cognitive concerns identified, deferred for follow up unless specifically addressed in assessment and plan.    Fall Risk Assessment  Has the patient had two or more falls in the last year or any fall with injury in the last year?  Yes  If fall risk identified, deferred for follow up unless specifically addressed in assessment and plan.    Safety Assessment  Throw rugs on floor.  Yes  Handrails on all stairs.  Yes  Good lighting in all hallways.  Yes  Difficulty hearing.  No  Patient counseled about all safety risks that were identified.    Functional Assessment ADLs  Are there any barriers preventing you from cooking for yourself or meeting nutritional needs?  Yes. Patient spouse assist  Are there any barriers preventing you from driving safely or obtaining transportation?  Yes. Patient spouse assist  Are there any barriers preventing you from using a telephone or calling for help?  Yes.    Are there any barriers preventing you from shopping?  Yes. Patient spouse assist  Are there any barriers preventing you from taking care of your own finances?  Yes.    Are there any barriers preventing you from managing your medications?  Yes. Patient spouse assist  Are there any barriers preventing you from showering, bathing or dressing yourself?  No.    Are you currently engaging in any exercise or physical activity?  No.     What is your perception of your health?  Good.    Health Maintenance Summary                IMM ZOSTER VACCINES Overdue 9/29/1998     BONE DENSITY Overdue 6/2/2020      Postponed 6/2/2015      Patient has more history with this topic...    IMM INFLUENZA Overdue 9/1/2020      Done 12/4/2019 Imm Admin: Influenza Vaccine Adult HD     Patient has more history with this topic...    MAMMOGRAM Overdue 9/14/2020      Done 9/14/2019 MA-SCREENING MAMMO BILAT W/TOMOSYNTHESIS W/CAD     Patient has more history with this  topic...    Annual Wellness Visit Overdue 12/4/2020      Done 12/4/2019 Visit Dx: Medicare annual wellness visit, subsequent     Patient has more history with this topic...    IMM PNEUMOCOCCAL VACCINE: 65+ Years Overdue 12/4/2020      Done 12/4/2019 Imm Admin: Pneumococcal Conjugate Vaccine (Prevnar/PCV-13)    IMM DTaP/Tdap/Td Vaccine Next Due 9/8/2024      Done 9/8/2014 Imm Admin: Tdap Vaccine    COLONOSCOPY Next Due 6/2/2025      Postponed 6/2/2015         Patient Care Team:  Chad Washburn M.D. as PCP - General (Family Medicine)  Dr. Bill Carranza as Consulting Physician (Ophthalmology)  Bill Waddell D.P.M. as Consulting Physician (Podiatry)  Almita Dillon as [facility]     Social History     Tobacco Use   • Smoking status: Never Smoker   • Smokeless tobacco: Never Used   Substance Use Topics   • Alcohol use: Yes     Alcohol/week: 0.6 oz     Types: 1 Standard drinks or equivalent per week     Frequency: Monthly or less     Drinks per session: 1 or 2     Binge frequency: Never   • Drug use: Never     Family History   Problem Relation Age of Onset   • Cancer Mother         colon   • Heart Disease Mother    • Hypertension Mother    • Hyperlipidemia Mother    • Diabetes Father    • No Known Problems Maternal Grandmother    • No Known Problems Maternal Grandfather    • No Known Problems Paternal Grandmother    • No Known Problems Paternal Grandfather    • Psychiatric Illness Neg Hx    • Stroke Neg Hx    • Thyroid Neg Hx      She  has a past medical history of Anxiety, Dyslipidemia, Hypovitaminosis D, Memory problem, Neck pain, and Panic attack. She also has no past medical history of Asthma, Chronic airway obstruction, not elsewhere classified, or Type II or unspecified type diabetes mellitus without mention of complication, not stated as uncontrolled.   Past Surgical History:   Procedure Laterality Date   • CERVICAL FUSION POSTERIOR  3/23/2009    Performed by ANYI GORDON  "at [facility]     Exam:   /64 (BP Location: Right arm, Patient Position: Sitting, BP Cuff Size: Adult)   Pulse 76   Temp 36.9 °C (98.4 °F) (Temporal)   Ht 1.689 m (5' 6.5\")   Wt 80 kg (176 lb 6.4 oz)   SpO2 96%  Body mass index is 28.05 kg/m².  Hearing  As above.    Dentition fair  Alert, oriented in no acute distress.  Eye contact is good, speech goal directed, affect calm    Labs  Reviewed and discussed per HPI    Assessment and Plan.    1. Medicare annual wellness visit, subsequent  Reviewed PMH, PSH, FH, SH, ALL, MEDS, HCM/IMM.   - Subsequent Annual Wellness Visit - Includes PPPS ()    2. Health care maintenance  Per HPI  - Subsequent Annual Wellness Visit - Includes PPPS ()    3. Encounter for screening mammogram for breast cancer  - MA-SCREENING MAMMO BILAT W/CAD; Future  - Subsequent Annual Wellness Visit - Includes PPPS ()    4. Need for vaccination  Per HPI  - Subsequent Annual Wellness Visit - Includes PPPS ()    5. Postmenopausal status (age-related) (natural)  - DS-BONE DENSITY STUDY (DEXA)  - Subsequent Annual Wellness Visit - Includes PPPS ()  6. Menopausal state  - DS-BONE DENSITY STUDY (DEXA)  - Subsequent Annual Wellness Visit - Includes PPPS ()    7. IFG (impaired fasting glucose)  Discussed about risk to develop DM.   Advised low carb diet, exercise, watch for WT.   - HEMOGLOBIN A1C; Future  - MICROALBUMIN CREAT RATIO URINE; Future  - Comp Metabolic Panel; Future  - Subsequent Annual Wellness Visit - Includes PPPS ()    8. Fatigue, unspecified type  Pending labs  - CBC WITH DIFFERENTIAL; Future  - TSH WITH REFLEX TO FT4; Future  - Subsequent Annual Wellness Visit - Includes PPPS ()    9. Dyslipidemia  No current medications, follow-up labs  - Comp Metabolic Panel; Future  - Lipid Profile; Future  - Subsequent Annual Wellness Visit - Includes PPPS ()    10. Stage 3 chronic kidney disease, unspecified whether stage 3a or 3b " CKD  Advised good fluid intake, avoid NSAIDs  - Comp Metabolic Panel; Future  - Subsequent Annual Wellness Visit - Includes PPPS ()    11. Hypovitaminosis D  No supplement, follow-up labs  - VITAMIN D,25 HYDROXY; Future  - Subsequent Annual Wellness Visit - Includes PPPS ()    12. Postmenopausal HRT (hormone replacement therapy)  Controlled, continue with current treatment.  - estradiol (ESTRACE) 2 MG Tab; Take 1 Tab by mouth every day.  Dispense: 90 Tab; Refill: 3  - Subsequent Annual Wellness Visit - Includes PPPS ()    13. Left knee pain, unspecified chronicity  Advised to continue Ace wraps, Tylenol as needed, activity as tolerated  - DX-KNEE 2- LEFT; Future  - Subsequent Annual Wellness Visit - Includes PPPS ()    Services suggested: No services needed at this time  Health Care Screening recommendations as per orders if indicated.  Referrals offered: PT/OT/Nutrition counseling/Behavioral Health/Smoking cessation as per orders if indicated.    Discussion today about general wellness and lifestyle habits:    · Prevent falls and reduce trip hazards; Cautioned about securing or removing rugs.  · Have a working fire alarm and carbon monoxide detector;   · Engage in regular physical activity and social activities     Follow-up: Within 2 weeks, follow-up labs and imaging    I attest that I saw this patient on this date and due to technical issues am not showing as the author of the note.

## 2021-01-04 ENCOUNTER — HOSPITAL ENCOUNTER (OUTPATIENT)
Dept: LAB | Facility: MEDICAL CENTER | Age: 71
End: 2021-01-04
Attending: INTERNAL MEDICINE
Payer: MEDICARE

## 2021-01-04 ENCOUNTER — HOSPITAL ENCOUNTER (OUTPATIENT)
Dept: RADIOLOGY | Facility: MEDICAL CENTER | Age: 71
End: 2021-01-04
Attending: INTERNAL MEDICINE
Payer: MEDICARE

## 2021-01-04 DIAGNOSIS — N18.30 STAGE 3 CHRONIC KIDNEY DISEASE, UNSPECIFIED WHETHER STAGE 3A OR 3B CKD: ICD-10-CM

## 2021-01-04 DIAGNOSIS — R53.83 FATIGUE, UNSPECIFIED TYPE: ICD-10-CM

## 2021-01-04 DIAGNOSIS — E78.5 DYSLIPIDEMIA: ICD-10-CM

## 2021-01-04 DIAGNOSIS — R73.01 IMPAIRED FASTING GLUCOSE: ICD-10-CM

## 2021-01-04 DIAGNOSIS — E55.9 HYPOVITAMINOSIS D: ICD-10-CM

## 2021-01-04 DIAGNOSIS — M25.562 LEFT KNEE PAIN, UNSPECIFIED CHRONICITY: ICD-10-CM

## 2021-01-04 LAB
25(OH)D3 SERPL-MCNC: 26 NG/ML (ref 30–100)
ALBUMIN SERPL BCP-MCNC: 4.2 G/DL (ref 3.2–4.9)
ALBUMIN/GLOB SERPL: 1.6 G/DL
ALP SERPL-CCNC: 107 U/L (ref 30–99)
ALT SERPL-CCNC: 16 U/L (ref 2–50)
ANION GAP SERPL CALC-SCNC: 12 MMOL/L (ref 7–16)
AST SERPL-CCNC: 21 U/L (ref 12–45)
BASOPHILS # BLD AUTO: 0.7 % (ref 0–1.8)
BASOPHILS # BLD: 0.07 K/UL (ref 0–0.12)
BILIRUB SERPL-MCNC: 0.6 MG/DL (ref 0.1–1.5)
BUN SERPL-MCNC: 17 MG/DL (ref 8–22)
CALCIUM SERPL-MCNC: 9.8 MG/DL (ref 8.5–10.5)
CHLORIDE SERPL-SCNC: 105 MMOL/L (ref 96–112)
CHOLEST SERPL-MCNC: 199 MG/DL (ref 100–199)
CO2 SERPL-SCNC: 24 MMOL/L (ref 20–33)
CREAT SERPL-MCNC: 0.88 MG/DL (ref 0.5–1.4)
CREAT UR-MCNC: 151.85 MG/DL
EOSINOPHIL # BLD AUTO: 0.15 K/UL (ref 0–0.51)
EOSINOPHIL NFR BLD: 1.4 % (ref 0–6.9)
ERYTHROCYTE [DISTWIDTH] IN BLOOD BY AUTOMATED COUNT: 45.5 FL (ref 35.9–50)
EST. AVERAGE GLUCOSE BLD GHB EST-MCNC: 134 MG/DL
FASTING STATUS PATIENT QL REPORTED: NORMAL
GLOBULIN SER CALC-MCNC: 2.7 G/DL (ref 1.9–3.5)
GLUCOSE SERPL-MCNC: 93 MG/DL (ref 65–99)
HBA1C MFR BLD: 6.3 % (ref 0–5.6)
HCT VFR BLD AUTO: 49.7 % (ref 37–47)
HDLC SERPL-MCNC: 42 MG/DL
HGB BLD-MCNC: 15.6 G/DL (ref 12–16)
IMM GRANULOCYTES # BLD AUTO: 0.02 K/UL (ref 0–0.11)
IMM GRANULOCYTES NFR BLD AUTO: 0.2 % (ref 0–0.9)
LDLC SERPL CALC-MCNC: 111 MG/DL
LYMPHOCYTES # BLD AUTO: 3.96 K/UL (ref 1–4.8)
LYMPHOCYTES NFR BLD: 36.9 % (ref 22–41)
MCH RBC QN AUTO: 28.3 PG (ref 27–33)
MCHC RBC AUTO-ENTMCNC: 31.4 G/DL (ref 33.6–35)
MCV RBC AUTO: 90.2 FL (ref 81.4–97.8)
MICROALBUMIN UR-MCNC: <1.2 MG/DL
MICROALBUMIN/CREAT UR: NORMAL MG/G (ref 0–30)
MONOCYTES # BLD AUTO: 0.81 K/UL (ref 0–0.85)
MONOCYTES NFR BLD AUTO: 7.6 % (ref 0–13.4)
NEUTROPHILS # BLD AUTO: 5.71 K/UL (ref 2–7.15)
NEUTROPHILS NFR BLD: 53.2 % (ref 44–72)
NRBC # BLD AUTO: 0 K/UL
NRBC BLD-RTO: 0 /100 WBC
PLATELET # BLD AUTO: 340 K/UL (ref 164–446)
PMV BLD AUTO: 10.2 FL (ref 9–12.9)
POTASSIUM SERPL-SCNC: 3.9 MMOL/L (ref 3.6–5.5)
PROT SERPL-MCNC: 6.9 G/DL (ref 6–8.2)
RBC # BLD AUTO: 5.51 M/UL (ref 4.2–5.4)
SODIUM SERPL-SCNC: 141 MMOL/L (ref 135–145)
TRIGL SERPL-MCNC: 232 MG/DL (ref 0–149)
TSH SERPL DL<=0.005 MIU/L-ACNC: 3.72 UIU/ML (ref 0.38–5.33)
WBC # BLD AUTO: 10.7 K/UL (ref 4.8–10.8)

## 2021-01-04 PROCEDURE — 73560 X-RAY EXAM OF KNEE 1 OR 2: CPT | Mod: LT

## 2021-01-04 PROCEDURE — 85025 COMPLETE CBC W/AUTO DIFF WBC: CPT

## 2021-01-04 PROCEDURE — 82306 VITAMIN D 25 HYDROXY: CPT

## 2021-01-04 PROCEDURE — 82043 UR ALBUMIN QUANTITATIVE: CPT

## 2021-01-04 PROCEDURE — 84443 ASSAY THYROID STIM HORMONE: CPT

## 2021-01-04 PROCEDURE — 80053 COMPREHEN METABOLIC PANEL: CPT

## 2021-01-04 PROCEDURE — 36415 COLL VENOUS BLD VENIPUNCTURE: CPT

## 2021-01-04 PROCEDURE — 82570 ASSAY OF URINE CREATININE: CPT

## 2021-01-04 PROCEDURE — 80061 LIPID PANEL: CPT

## 2021-01-04 PROCEDURE — 83036 HEMOGLOBIN GLYCOSYLATED A1C: CPT

## 2021-01-05 ENCOUNTER — TELEPHONE (OUTPATIENT)
Dept: MEDICAL GROUP | Age: 71
End: 2021-01-05

## 2021-01-05 NOTE — TELEPHONE ENCOUNTER
ESTABLISHED PATIENT PRE-VISIT PLANNING     01/05/2021 Called patient & spoke to Spouse Paul Sorensen. Patient unavailable at time of call. Advised Virtual Visit scheduled Wednesday, 01/06/2021@5:00PM-. Obie active & Zoom ez downloaded. Order Pended. Labs Done.     Patient was contacted to complete PVP.     Note: Patient will not be contacted if there is no indication to call.     1.  Reviewed notes from the last few office visits within the medical group: Yes    2.  If any orders were placed at last visit or intended to be done for this visit (i.e. 6 mos follow-up), do we have Results/Consult Notes?         •  Labs - Labs ordered, completed on 01/04/2021 and results are in chart.  Note: If patient appointment is for lab review and patient did not complete labs, check with provider if OK to reschedule patient until labs completed.       •  Imaging - Imaging ordered, NOT completed. Patient advised to complete prior to next appointment.          •  Referrals - No referrals were ordered at last office visit.    3. Is this appointment scheduled as a Hospital Follow-Up? No    4.  Immunizations were updated in Epic using Reconcile Outside Information activity? Yes    5.  Patient is due for the following Health Maintenance Topics:   Health Maintenance Due   Topic Date Due   • IMM ZOSTER VACCINES (1 of 2) 09/29/1998   • BONE DENSITY  06/02/2020   • MAMMOGRAM  09/14/2020     6.  AHA (Pulse8) form printed for Provider? Yes

## 2021-01-06 ENCOUNTER — TELEPHONE (OUTPATIENT)
Dept: MEDICAL GROUP | Age: 71
End: 2021-01-06

## 2021-01-06 ENCOUNTER — TELEMEDICINE (OUTPATIENT)
Dept: MEDICAL GROUP | Age: 71
End: 2021-01-06
Payer: MEDICARE

## 2021-01-06 VITALS — WEIGHT: 176 LBS | TEMPERATURE: 97.5 F | HEIGHT: 67 IN | BODY MASS INDEX: 27.62 KG/M2

## 2021-01-06 DIAGNOSIS — M25.562 LEFT KNEE PAIN, UNSPECIFIED CHRONICITY: ICD-10-CM

## 2021-01-06 DIAGNOSIS — R73.01 IMPAIRED FASTING GLUCOSE: ICD-10-CM

## 2021-01-06 DIAGNOSIS — M17.12 PRIMARY OSTEOARTHRITIS OF LEFT KNEE: ICD-10-CM

## 2021-01-06 DIAGNOSIS — E55.9 HYPOVITAMINOSIS D: ICD-10-CM

## 2021-01-06 DIAGNOSIS — E78.5 DYSLIPIDEMIA: ICD-10-CM

## 2021-01-06 PROBLEM — N18.30 CKD (CHRONIC KIDNEY DISEASE), STAGE III: Status: RESOLVED | Noted: 2019-01-02 | Resolved: 2021-01-06

## 2021-01-06 PROCEDURE — 99214 OFFICE O/P EST MOD 30 MIN: CPT | Mod: 95,CR | Performed by: INTERNAL MEDICINE

## 2021-01-06 RX ORDER — CELECOXIB 100 MG/1
100 CAPSULE ORAL 2 TIMES DAILY
Qty: 180 CAP | Refills: 5 | Status: SHIPPED | OUTPATIENT
Start: 2021-01-06 | End: 2022-07-05

## 2021-01-06 ASSESSMENT — FIBROSIS 4 INDEX: FIB4 SCORE: 1.11

## 2021-01-06 NOTE — PROGRESS NOTES
Telemedicine Visit: Established Patient     This Remote Face to Face encounter was conducted via Zoom. Given the importance of social distancing and other strategies recommended to reduce the risk of COVID-19 transmission, I am providing medical care to this patient via audio/video visit in place of an in person visit at the request of the patient. Verbal consent to telehealth, risks, benefits, and consequences were discussed. Patient retains the right to withdraw at any time. All existing confidentiality protections apply. The patient has access to all transmitted medical information. No dissemination of any patient images or information to other entities without further written consent.    CHIEF COMPLAINT     IFG, labs    HPI  Jessica Sorensen is a 70 y.o. female who presents today for the following     IFG  The patient had elevated hemoglobin A1c and fasting blood sugar:   No abdominal pain, weight loss, fatigue.  Diet: like sweets  Exercise: Daily physical activity  BMI: 27  Family history diabetes: Father     Dyslipidemia  The patient was on pravastatin 40 mg QD.  No myalgias, muscle cramps or pain.   Diet/exercise/BMI: As above  FH: mother      Hypovitaminosis D  The patient had low vitamin D level.  Vitamin D supplement: OTC.     Left knee pain, OA  Interval course:  - XR showed OA  -Continues to have pain    - Onset: 3-4 weeks ago  - [employer]iger: working at home  - located in: above the LT knee cup  - intensity:  mild to moderate  - quality:  dull and sharp  - radiation:  no  - alleviating factors are:  rest, ace wraps  -  exacerbating factors are:  activity  - accompanied: no numbness, weakness, tingling, fever, chills  - course: persistent  - imaging: pending  - treatment: as above    XR LT knee, 1/4/2020  Joint spaces are relatively well-preserved with minimal degenerative spurring in the superior aspect of the patella.     Reviewed PMH, PSH, FH, SH, ALL, HCM/IMM, no changes  Reviewed MEDS, no  changes    Patient Active Problem List    Diagnosis Date Noted   • Leg swelling 08/27/2019   • Gastroesophageal reflux disease 08/27/2019   • IFG (impaired fasting glucose) 01/02/2019   • CKD (chronic kidney disease), stage III 01/02/2019   • Health care maintenance 12/03/2015   • Postmenopausal HRT (hormone replacement therapy) 06/25/2015   • Dyslipidemia 06/02/2015     CURRENT MEDICATIONS  Current Outpatient Medications   Medication Sig Dispense Refill   • estradiol (ESTRACE) 2 MG Tab Take 1 Tab by mouth every day. 90 Tab 3   • ibuprofen (MOTRIN) 800 MG Tab Take 800 mg by mouth as needed.     • fenofibrate (TRIGLIDE) 160 MG tablet Take 1 Tab by mouth every day. 90 Tab 3     No current facility-administered medications for this visit.      ALLERGIES  Allergies: Neosporin pain relieving  PAST MEDICAL HISTORY  Past Medical History:   Diagnosis Date   • Anxiety    • Dyslipidemia    • Hypovitaminosis D    • Memory problem    • Neck pain    • Panic attack      SURGICAL HISTORY  She  has a past surgical history that includes cervical fusion posterior (3/23/2009).  SOCIAL HISTORY  Social History     Tobacco Use   • Smoking status: Never Smoker   • Smokeless tobacco: Never Used   Substance Use Topics   • Alcohol use: Yes     Alcohol/week: 0.6 oz     Types: 1 Standard drinks or equivalent per week     Frequency: Monthly or less     Drinks per session: 1 or 2     Binge frequency: Never   • Drug use: Never     Social History     Social History Narrative    Lives with .     Children: 4.     Work: retired     FAMILY HISTORY  Family History   Problem Relation Age of Onset   • Cancer Mother         colon   • Heart Disease Mother    • Hypertension Mother    • Hyperlipidemia Mother    • Diabetes Father    • No Known Problems Maternal Grandmother    • No Known Problems Maternal Grandfather    • No Known Problems Paternal Grandmother    • No Known Problems Paternal Grandfather    • Psychiatric Illness Neg Hx    • Stroke Neg Hx  "   • Thyroid Neg Hx      Family Status   Relation Name Status   • Mo     • Fa     • MGMo     • MGFa     • PGMo     • PGFa     • Neg Hx  (Not Specified)     ROS   Constitutional: Negative for fever, chills, fatigue.  HENT: Negative for congestion, sore throat.  Eyes: Negative for vision problems.   Respiratory: Negative for cough, shortness of breath.  Cardiovascular: Negative for chest pain, palpitations.   Gastrointestinal: Negative for heartburn, nausea, abdominal pain.   Genitourinary: Negative for dysuria.  Musculoskeletal: as above.   Skin: Negative for rash.   Neuro: Negative for dizziness, weakness and headaches.   Endo/Heme/Allergies: Does not bruise/bleed easily.   Psychiatric/Behavioral: Negative for depression.    Objective   Vitals obtained by patient:  Temperature 36.4 °C (97.5 °F)   Height 1.689 m (5' 6.5\")   Weight 79.8 kg (176 lb)   Body Mass Index 27.98 kg/m²   Physical Exam:  Constitutional: Alert, no distress, well-groomed.  Skin: No rash in visible areas.  Eye: Round. Conjunctiva clear, lids normal.  ENMT: Lips pink without lesions, good dentition. Phonation normal.  Neck: No visible masses or thyromegaly. Moves freely without pain.  CV: no peripheral cyanosis, tachycardia.  Respiratory: Unlabored respiratory effort, no cough or audible wheezing.  Psych: Alert and oriented x3, normal affect and mood.     Labs     Labs are reviewed and discussed with a patient  Lab Results   Component Value Date/Time    CHOLSTRLTOT 199 2021 11:37 AM     (H) 2021 11:37 AM    HDL 42 2021 11:37 AM    TRIGLYCERIDE 232 (H) 2021 11:37 AM       Lab Results   Component Value Date/Time    SODIUM 141 2021 11:37 AM    POTASSIUM 3.9 2021 11:37 AM    CHLORIDE 105 2021 11:37 AM    CO2 24 2021 11:37 AM    GLUCOSE 93 2021 11:37 AM    BUN 17 2021 11:37 AM    CREATININE 0.88 2021 11:37 AM     Lab Results "   Component Value Date/Time    ALKPHOSPHAT 107 (H) 01/04/2021 11:37 AM    ASTSGOT 21 01/04/2021 11:37 AM    ALTSGPT 16 01/04/2021 11:37 AM    TBILIRUBIN 0.6 01/04/2021 11:37 AM      Lab Results   Component Value Date/Time    HBA1C 6.3 (H) 01/04/2021 11:37 AM    HBA1C 6.4 (H) 12/21/2019 07:31 AM    HBA1C 6.1 (H) 08/17/2019 07:28 AM     No results found for: TSH  Lab Results   Component Value Date/Time    FREET4 0.67 02/06/2019 06:13 AM       Lab Results   Component Value Date/Time    WBC 10.7 01/04/2021 11:37 AM    RBC 5.51 (H) 01/04/2021 11:37 AM    HEMOGLOBIN 15.6 01/04/2021 11:37 AM    HEMATOCRIT 49.7 (H) 01/04/2021 11:37 AM    MCV 90.2 01/04/2021 11:37 AM    MCH 28.3 01/04/2021 11:37 AM    MCHC 31.4 (L) 01/04/2021 11:37 AM    MPV 10.2 01/04/2021 11:37 AM    NEUTSPOLYS 53.20 01/04/2021 11:37 AM    LYMPHOCYTES 36.90 01/04/2021 11:37 AM    MONOCYTES 7.60 01/04/2021 11:37 AM    EOSINOPHILS 1.40 01/04/2021 11:37 AM    BASOPHILS 0.70 01/04/2021 11:37 AM      Imaging      Reviewed and discussed per HPI    Assessment and Plan     Jessica Sorensen is a 70 y.o. female    1. IFG (impaired fasting glucose)  Improved.  Discussed about risk to develop DM.   Advised low carb diet, exercise, watch for WT.   - Comp Metabolic Panel; Future  - HEMOGLOBIN A1C; Future    2. Dyslipidemia  She just restarted fenofibrate, follow-up labs  - Lipid Profile; Future  - Comp Metabolic Panel; Future    3. Hypovitaminosis D  Advised to start 5000 units of vitamin D daily, OTC  - VITAMIN D,25 HYDROXY; Future    4. Left knee pain, unspecified chronicity  Start Celebrex, continue activity as tolerated, brace  - celecoxib (CELEBREX) 100 MG Cap; Take 1 Cap by mouth 2 times a day.  Dispense: 180 Cap; Refill: 5  - REFERRAL TO ORTHOPEDICS  5. Primary osteoarthritis of left knee  - celecoxib (CELEBREX) 100 MG Cap; Take 1 Cap by mouth 2 times a day.  Dispense: 180 Cap; Refill: 5  - REFERRAL TO ORTHOPEDICS    Follow-up: In 6 months and as needed

## 2021-01-07 NOTE — TELEPHONE ENCOUNTER
Phone Number Called: [phone] (home)       Call outcome: Spoke to patient regarding message below.    Message: Called patient, spoke to her  to let them know that Dr Hutton placed an Orthopedic referral so if patient continues to get worse she can make an appointment with them. I let them know to call us if they need anything else.

## 2021-01-15 DIAGNOSIS — Z23 NEED FOR VACCINATION: ICD-10-CM

## 2021-01-20 DIAGNOSIS — E78.5 DYSLIPIDEMIA: ICD-10-CM

## 2021-01-21 RX ORDER — ATORVASTATIN CALCIUM 40 MG/1
TABLET, FILM COATED ORAL
Qty: 100 TAB | Refills: 4 | Status: SHIPPED | OUTPATIENT
Start: 2021-01-21 | End: 2022-07-07 | Stop reason: SDUPTHER

## 2021-05-03 ENCOUNTER — TELEPHONE (OUTPATIENT)
Dept: MEDICAL GROUP | Age: 71
End: 2021-05-03

## 2021-05-03 NOTE — TELEPHONE ENCOUNTER
"ESTABLISHED PATIENT PRE-VISIT PLANNING     Monday, 05/03/2021@9:53AM:    Phone Number Called: [phone] (home)   Call outcome: Spoke to patient's spouse Paul Sorensen who is listed in patient's chart as a patient contact, under demographics tab, with permissions to discuss both billing and treatment.  Message: Advised Virtual Visit scheduled with ,Tuesday, 05/04/2021@4:00PM. Patient's spouse states both Zoom and [employer] applications downloaded to device.    Patient was contacted to complete PVP.     Note: Patient will not be contacted if there is no indication to call.     1.  Reviewed notes from the last few office visits within the medical group: Yes    2.  If any orders were placed at last visit or intended to be done for this visit (i.e. 6 mos follow-up), do we have Results/Consult Notes?         •  Labs - Labs ordered, but not to be completed until Per 's Telemedicine visit notes from 01/06/2021: \"Return in about 6 months (around 7/6/2021) for LABS.\"..  Note: If patient appointment is for lab review and patient did not complete labs, check with provider if OK to reschedule patient until labs completed.       •  Imaging - Imaging ordered, NOT completed. Patient's spouse advised: MAMMO and Bone Density scan not done.         •  Referrals - Referral ordered, patient has NOT been seen.   Called and spoke to [facility]. Per their staff, referral received, patient was contacted and patient declined to schedule appointment. Patient has not been seen.    3. Is this appointment scheduled as a Hospital Follow-Up? No    4.  Immunizations were updated in Epic using Reconcile Outside Information activity? Yes    5.  Patient is due for the following Health Maintenance Topics:   Health Maintenance Due   Topic Date Due   • COVID-19 Vaccine (1) Never done   • IMM ZOSTER VACCINES (1 of 2) Never done   • BONE DENSITY  06/02/2020   • MAMMOGRAM  09/14/2020     6.  AHA (Pulse8) form printed for Provider? " No, patient does not have any open alerts

## 2021-05-04 ENCOUNTER — TELEMEDICINE (OUTPATIENT)
Dept: MEDICAL GROUP | Age: 71
End: 2021-05-04
Payer: MEDICARE

## 2021-05-04 VITALS
DIASTOLIC BLOOD PRESSURE: 74 MMHG | TEMPERATURE: 97.3 F | HEIGHT: 67 IN | SYSTOLIC BLOOD PRESSURE: 139 MMHG | BODY MASS INDEX: 28.56 KG/M2 | WEIGHT: 182 LBS

## 2021-05-04 DIAGNOSIS — R73.01 IFG (IMPAIRED FASTING GLUCOSE): ICD-10-CM

## 2021-05-04 DIAGNOSIS — E55.9 HYPOVITAMINOSIS D: ICD-10-CM

## 2021-05-04 DIAGNOSIS — M79.89 LEG SWELLING: ICD-10-CM

## 2021-05-04 DIAGNOSIS — E78.5 DYSLIPIDEMIA: ICD-10-CM

## 2021-05-04 DIAGNOSIS — R03.0 ELEVATED BLOOD PRESSURE READING: ICD-10-CM

## 2021-05-04 DIAGNOSIS — Z12.31 ENCOUNTER FOR SCREENING MAMMOGRAM FOR MALIGNANT NEOPLASM OF BREAST: ICD-10-CM

## 2021-05-04 PROCEDURE — 99214 OFFICE O/P EST MOD 30 MIN: CPT | Mod: 95,CR | Performed by: INTERNAL MEDICINE

## 2021-05-04 RX ORDER — HYDROCHLOROTHIAZIDE 25 MG/1
25 TABLET ORAL DAILY
Qty: 90 TABLET | Refills: 0 | Status: SHIPPED | OUTPATIENT
Start: 2021-05-04 | End: 2022-05-23

## 2021-05-04 ASSESSMENT — PATIENT HEALTH QUESTIONNAIRE - PHQ9: CLINICAL INTERPRETATION OF PHQ2 SCORE: 0

## 2021-05-04 ASSESSMENT — FIBROSIS 4 INDEX: FIB4 SCORE: 1.11

## 2021-05-04 NOTE — PROGRESS NOTES
Telemedicine Visit: Established Patient     This Remote Face to Face encounter was conducted via Zoom. Given the importance of social distancing and other strategies recommended to reduce the risk of COVID-19 transmission, I am providing medical care to this patient via audio/video visit in place of an in person visit at the request of the patient. Verbal consent to telehealth, risks, benefits, and consequences were discussed. Patient retains the right to withdraw at any time. All existing confidentiality protections apply. The patient has access to all transmitted medical information. No dissemination of any patient images or information to other entities without further written consent.    CHIEF COMPLAINT     Chief Complaint   Patient presents with   • Paperwork     FMLA    • Leg Swelling     HPI  Jessica Sorensen is a 70 y.o. female who presents today for the following     LE swelling, elevated BP reading  The patient c/o LE swelling.  BP was elevated  No meds, start HCTZ 25 mg in AMs    Denies:  -  headaches, vision problems, tinnitus.                 -  chest pain/pressure, palpitations, irregular heart beats, exertional, dyspnea, peripheral edema.      - medication side effect: unusual fatigue, slow heartbeat, foot/leg swelling, cough.  Low salt diet: advised  Diet: advised   Exercise: advised QD  BMI: Body mass index is 28.94 kg/m².  FH of HTN: mother    IFG  The patient had elevated hemoglobin A1c and fasting blood sugar:   No abdominal pain, weight loss, fatigue.  Diet: like sweets  Exercise: Daily physical activity  BMI: 28  Family history diabetes: Father     Dyslipidemia  The patient was on pravastatin 40 mg QD.  No myalgias, muscle cramps or pain.   Diet/exercise/BMI: As above  FH: mother      Hypovitaminosis D  The patient had low vitamin D level.  Vitamin D supplement: OTC.    Reviewed PMH, PSH, FH, SH, ALL, HCM/IMM, no changes  Reviewed MEDS, no changes    Patient Active Problem List    Diagnosis  Date Noted   • Leg swelling 08/27/2019   • Gastroesophageal reflux disease 08/27/2019   • IFG (impaired fasting glucose) 01/02/2019   • Health care maintenance 12/03/2015   • Postmenopausal HRT (hormone replacement therapy) 06/25/2015   • Dyslipidemia 06/02/2015     CURRENT MEDICATIONS  Current Outpatient Medications   Medication Sig Dispense Refill   • atorvastatin (LIPITOR) 40 MG Tab TAKE ONE TABLET BY MOUTH ONCE DAILY 100 Tab 4   • celecoxib (CELEBREX) 100 MG Cap Take 1 Cap by mouth 2 times a day. 180 Cap 5   • estradiol (ESTRACE) 2 MG Tab Take 1 Tab by mouth every day. 90 Tab 3   • ibuprofen (MOTRIN) 800 MG Tab Take 800 mg by mouth as needed.     • fenofibrate (TRIGLIDE) 160 MG tablet Take 1 Tab by mouth every day. 90 Tab 3     No current facility-administered medications for this visit.     ALLERGIES  Allergies: Neosporin pain relieving  PAST MEDICAL HISTORY  Past Medical History:   Diagnosis Date   • Anxiety    • Dyslipidemia    • Hypovitaminosis D    • Memory problem    • Neck pain    • Panic attack      SURGICAL HISTORY  She  has a past surgical history that includes cervical fusion posterior (3/23/2009).  SOCIAL HISTORY  Social History     Tobacco Use   • Smoking status: Never Smoker   • Smokeless tobacco: Never Used   Substance Use Topics   • Alcohol use: Yes     Alcohol/week: 0.6 oz     Types: 1 Standard drinks or equivalent per week   • Drug use: Never     Social History     Social History Narrative    Lives with .     Children: 4.     Work: retired     FAMILY HISTORY  Family History   Problem Relation Age of Onset   • Cancer Mother         colon   • Heart Disease Mother    • Hypertension Mother    • Hyperlipidemia Mother    • Diabetes Father    • No Known Problems Maternal Grandmother    • No Known Problems Maternal Grandfather    • No Known Problems Paternal Grandmother    • No Known Problems Paternal Grandfather    • Psychiatric Illness Neg Hx    • Stroke Neg Hx    • Thyroid Neg Hx      Family  "Status   Relation Name Status   • Mo     • Fa     • MGMo     • MGFa     • PGMo     • PGFa     • Neg Hx  (Not Specified)     ROS   Constitutional: Negative for fever, chills.  HENT: Negative for congestion, sore throat.  Eyes: Negative for vision problems.   Respiratory: Negative for cough, shortness of breath.  Cardiovascular: Negative for chest pain, palpitations.   Gastrointestinal: Negative for heartburn, nausea, abdominal pain.   Musculoskeletal: Negative for significant myalgia, back and joint pain.   Skin: Negative for rash.   Neuro: Negative for dizziness, weakness and headaches.   Endo/Heme/Allergies: Does not bruise/bleed easily. Memory concern  Psychiatric/Behavioral: Negative for depression.    Objective   Vitals obtained by patient:  Blood Pressure 139/74 (BP Location: Right arm, Patient Position: Sitting)   Temperature 36.3 °C (97.3 °F)   Height 1.689 m (5' 6.5\")   Weight 82.6 kg (182 lb)   Body Mass Index 28.94 kg/m²   Physical Exam:  Constitutional: Alert, no distress, well-groomed.  Skin: No rash in visible areas.  Eye: Round. Conjunctiva clear, lids normal.  ENMT: Lips pink without lesions, good dentition. Phonation normal.  Neck: No visible masses or thyromegaly. Moves freely without pain.  CV: no peripheral cyanosis, tachycardia.  Respiratory: Unlabored respiratory effort, no cough or audible wheezing.  Psych: Alert and oriented x3, normal affect and mood.     Labs     Labs are reviewed and discussed with a patient  Lab Results   Component Value Date/Time    CHOLSTRLTOT 199 2021 11:37 AM     (H) 2021 11:37 AM    HDL 42 2021 11:37 AM    TRIGLYCERIDE 232 (H) 2021 11:37 AM       Lab Results   Component Value Date/Time    SODIUM 141 2021 11:37 AM    POTASSIUM 3.9 2021 11:37 AM    CHLORIDE 105 2021 11:37 AM    CO2 24 2021 11:37 AM    GLUCOSE 93 2021 11:37 AM    BUN 17 2021 11:37 AM    " CREATININE 0.88 01/04/2021 11:37 AM     Lab Results   Component Value Date/Time    ALKPHOSPHAT 107 (H) 01/04/2021 11:37 AM    ASTSGOT 21 01/04/2021 11:37 AM    ALTSGPT 16 01/04/2021 11:37 AM    TBILIRUBIN 0.6 01/04/2021 11:37 AM      Lab Results   Component Value Date/Time    HBA1C 6.3 (H) 01/04/2021 11:37 AM    HBA1C 6.4 (H) 12/21/2019 07:31 AM    HBA1C 6.1 (H) 08/17/2019 07:28 AM     No results found for: TSH  Lab Results   Component Value Date/Time    FREET4 0.67 02/06/2019 06:13 AM     Lab Results   Component Value Date/Time    WBC 10.7 01/04/2021 11:37 AM    RBC 5.51 (H) 01/04/2021 11:37 AM    HEMOGLOBIN 15.6 01/04/2021 11:37 AM    HEMATOCRIT 49.7 (H) 01/04/2021 11:37 AM    MCV 90.2 01/04/2021 11:37 AM    MCH 28.3 01/04/2021 11:37 AM    MCHC 31.4 (L) 01/04/2021 11:37 AM    MPV 10.2 01/04/2021 11:37 AM    NEUTSPOLYS 53.20 01/04/2021 11:37 AM    LYMPHOCYTES 36.90 01/04/2021 11:37 AM    MONOCYTES 7.60 01/04/2021 11:37 AM    EOSINOPHILS 1.40 01/04/2021 11:37 AM    BASOPHILS 0.70 01/04/2021 11:37 AM      Imaging      None    Assessment and Plan     Jessica Sorensen is a 70 y.o. female    1. Elevated blood pressure reading  Start HCTZ, advised to continue monitoring blood pressure at home  - hydroCHLOROthiazide (HYDRODIURIL) 25 MG Tab; Take 1 tablet by mouth every day.  Dispense: 90 tablet; Refill: 0  2. Leg swelling  - hydroCHLOROthiazide (HYDRODIURIL) 25 MG Tab; Take 1 tablet by mouth every day.  Dispense: 90 tablet; Refill: 0    3. IFG (impaired fasting glucose)  - Discussed about risk to develop DM.   - Advised low carb diet, exercise, watch for WT.     4. Dyslipidemia  - Controlled, continue with current management.    5. Hypovitaminosis D  - improved, continue with current management.    6. Encounter for screening mammogram for malignant neoplasm of breast  - MA-SCREENING MAMMO BILAT W/TOMOSYNTHESIS W/CAD; Future    Follow-up: within 7 days, f/u BP, memory testing

## 2021-05-18 ENCOUNTER — TELEPHONE (OUTPATIENT)
Dept: MEDICAL GROUP | Age: 71
End: 2021-05-18

## 2021-05-18 NOTE — TELEPHONE ENCOUNTER
ESTABLISHED PATIENT PRE-VISIT PLANNING   Tuesday, 05/18/2021@10:20AM:    Phone Number Called: [phone] (home) and [phone] (mobile)  Call outcome: No answer. Voice Mail Box not setup  Message: Unable to leave voicemail. Received message on both telephone numbers called stating voicemail box has not been setup.    Patient was contacted to complete PVP.     Note: Patient will not be contacted if there is no indication to call.     1.  Reviewed notes from the last few office visits within the medical group: Yes    2.  If any orders were placed at last visit or intended to be done for this visit (i.e. 6 mos follow-up), do we have Results/Consult Notes?         •  Labs - Labs ordered, NOT completed.     Note: If patient appointment is for lab review and patient did not complete labs, check with provider if OK to reschedule patient until labs completed.       •  Imaging - Imaging ordered, NOT completed:    MAMMO and BONE DENSITY STUDY are scheduled       •  Referrals - Referral ordered, patient has NOT been seen.     Called and spoke to [facility] patient, per their office, they reached out to patient for scheduling and patient declined to be seen.    3. Is this appointment scheduled as a Hospital Follow-Up? No    4.  Immunizations were updated in Epic using Reconcile Outside Information activity? Yes    5.  Patient is due for the following Health Maintenance Topics:   Health Maintenance Due   Topic Date Due   • COVID-19 Vaccine (1) Never done   • IMM ZOSTER VACCINES (1 of 2) Never done   • BONE DENSITY  06/02/2020   • MAMMOGRAM  09/14/2020       6.  AHA (Pulse8) form printed for Provider? No, patient does not have any open alerts

## 2021-05-19 ENCOUNTER — OFFICE VISIT (OUTPATIENT)
Dept: MEDICAL GROUP | Age: 71
End: 2021-05-19
Payer: MEDICARE

## 2021-05-19 VITALS
DIASTOLIC BLOOD PRESSURE: 62 MMHG | BODY MASS INDEX: 28.09 KG/M2 | SYSTOLIC BLOOD PRESSURE: 120 MMHG | TEMPERATURE: 99.5 F | OXYGEN SATURATION: 97 % | HEART RATE: 80 BPM | HEIGHT: 67 IN | WEIGHT: 179 LBS

## 2021-05-19 DIAGNOSIS — R73.01 IMPAIRED FASTING GLUCOSE: ICD-10-CM

## 2021-05-19 DIAGNOSIS — R41.0 CONFUSION: ICD-10-CM

## 2021-05-19 DIAGNOSIS — F68.8 PERSONALITY CHANGE: ICD-10-CM

## 2021-05-19 DIAGNOSIS — R45.4 ANGER: ICD-10-CM

## 2021-05-19 DIAGNOSIS — R41.3 MEMORY DIFFICULTIES: ICD-10-CM

## 2021-05-19 DIAGNOSIS — E78.5 DYSLIPIDEMIA: ICD-10-CM

## 2021-05-19 PROCEDURE — 99214 OFFICE O/P EST MOD 30 MIN: CPT | Performed by: INTERNAL MEDICINE

## 2021-05-19 ASSESSMENT — FIBROSIS 4 INDEX: FIB4 SCORE: 1.11

## 2021-05-19 ASSESSMENT — PATIENT HEALTH QUESTIONNAIRE - PHQ9: CLINICAL INTERPRETATION OF PHQ2 SCORE: 0

## 2021-05-19 NOTE — PROGRESS NOTES
"CHIEF COMPLAINT  Chief Complaint   Patient presents with   • Dementia     testing for memory problems     HPI  Jessica Sorensen is a 70 y.o. female who presents today for the following     Memory difficulty, IFG, Dyslipidemia  69 y/o, F, with the above comorbidities.    In the last 2-3 yre, it was noticed by her  and mother in law that:  -  she has been slowing down  - being introvert, not willing to do house or yard work  - forgot remembering how to do some errands ( trash beg, does not know what to do)  - no short term memory, misplacing objects  - difficulty communication / finding words  - writing difficulty  - became often angry, personality change  - appers often confused    During the conversation with patient, it seems that there is some relationship issue wife-.  She was unaware she was in MD office.    MMSE - scanned in media Points   -What is the:  Year, season, date, day, month.   1/ 5    -Where are we:   State, county, town, hospital, floor. 1/ 5   Registration: 3 objects immediate recall.  0/ 3   Attention/calculation: \"World\" backwards or serial sevens.       1/ 5    Recall: 3 min recall of 3 objects      0/ 3   Language:      · Name 2 objects (Pencil, watch)        Repeat the following:    \"No ifs and is or buts\"  2/ 2 1/ 1   · Follow a 3 stage command:        Take paper with the right hand, fold in half, place on floor.   3/ 3   · Follow a written command: \"Close your eyes\"      1/ 1   · Write a complete sentence.  1/ 1   · Copy a design., or draw clock 0/ 1   Total 11/ 30     Depression Screen (PHQ-2/PHQ-9) 12/31/2020 5/4/2021 5/19/2021   PHQ-2 Total Score - - -   PHQ-2 Total Score 0 0 0     Reviewed PMH, PSH, FH, SH, ALL, HCM/IMM, no changes  Reviewed MEDS, no changes    Patient Active Problem List    Diagnosis Date Noted   • Leg swelling 08/27/2019   • Gastroesophageal reflux disease 08/27/2019   • IFG (impaired fasting glucose) 01/02/2019   • Health care maintenance " 12/03/2015   • Postmenopausal HRT (hormone replacement therapy) 06/25/2015   • Dyslipidemia 06/02/2015     CURRENT MEDICATIONS  Current Outpatient Medications   Medication Sig Dispense Refill   • hydroCHLOROthiazide (HYDRODIURIL) 25 MG Tab Take 1 tablet by mouth every day. 90 tablet 0   • atorvastatin (LIPITOR) 40 MG Tab TAKE ONE TABLET BY MOUTH ONCE DAILY 100 Tab 4   • celecoxib (CELEBREX) 100 MG Cap Take 1 Cap by mouth 2 times a day. 180 Cap 5   • estradiol (ESTRACE) 2 MG Tab Take 1 Tab by mouth every day. 90 Tab 3   • ibuprofen (MOTRIN) 800 MG Tab Take 800 mg by mouth as needed.     • fenofibrate (TRIGLIDE) 160 MG tablet Take 1 Tab by mouth every day. 90 Tab 3     No current facility-administered medications for this visit.     ALLERGIES  Allergies: Neosporin pain relieving  PAST MEDICAL HISTORY  Past Medical History:   Diagnosis Date   • Anxiety    • Dyslipidemia    • Hypovitaminosis D    • Memory problem    • Neck pain    • Panic attack      SURGICAL HISTORY  She  has a past surgical history that includes cervical fusion posterior (3/23/2009).  SOCIAL HISTORY  Social History     Tobacco Use   • Smoking status: Never Smoker   • Smokeless tobacco: Never Used   Vaping Use   • Vaping Use: Never used   Substance Use Topics   • Alcohol use: Yes     Alcohol/week: 0.6 oz     Types: 1 Standard drinks or equivalent per week   • Drug use: Never     Social History     Social History Narrative    Lives with .     Children: 4.     Work: retired     FAMILY HISTORY  Family History   Problem Relation Age of Onset   • Cancer Mother         colon   • Heart Disease Mother    • Hypertension Mother    • Hyperlipidemia Mother    • Diabetes Father    • No Known Problems Maternal Grandmother    • No Known Problems Maternal Grandfather    • No Known Problems Paternal Grandmother    • No Known Problems Paternal Grandfather    • Psychiatric Illness Neg Hx    • Stroke Neg Hx    • Thyroid Neg Hx      Family Status   Relation Name  "Status   • Mo     • Fa     • MGMo     • MGFa     • PGMo     • PGFa     • Neg Hx  (Not Specified)     ROS   Constitutional: Negative for fever, chills.  Eyes: Negative for vision problems.   Respiratory: Negative for cough, shortness of breath.  Cardiovascular: Negative for chest pain, palpitations.   Musculoskeletal: Negative for muscle twitching.  Skin: Negative for rash.   Neuro: Negative for dizziness, weakness and headaches.   Endo/Heme/Allergies: Does not bruise/bleed easily.   Psychiatric/Behavioral: Negative for depression.    PHYSICAL EXAM   Blood Pressure 120/62 (BP Location: Left arm, Patient Position: Sitting, BP Cuff Size: Adult)   Pulse 80   Temperature 37.5 °C (99.5 °F) (Temporal)   Height 1.689 m (5' 6.5\")   Weight 81.2 kg (179 lb)   Oxygen Saturation 97%   Body Mass Index 28.46 kg/m²   General:  NAD, well appearing  HEENT:   NC/AT, PERRLA, EOMI.  Cardiovascular: unlabored breathing, no peripheral cyanosis or swelling.  Lungs:   no respiratory distress.  Abdomen: non- distended.  Extremities:  No LE swelling.  Skin:  Warm, dry.  No erythema. No rash.   Neurologic: Alert & oriented x 3. CN II-XII grossly intact. No focal deficits.  Psychiatric:  Affect normal, mood normal, judgment normal.    Labs     Labs are reviewed and discussed with a patient  Lab Results   Component Value Date/Time    CHOLSTRLTOT 199 2021 11:37 AM     (H) 2021 11:37 AM    HDL 42 2021 11:37 AM    TRIGLYCERIDE 232 (H) 2021 11:37 AM       Lab Results   Component Value Date/Time    SODIUM 141 2021 11:37 AM    POTASSIUM 3.9 2021 11:37 AM    CHLORIDE 105 2021 11:37 AM    CO2 24 2021 11:37 AM    GLUCOSE 93 2021 11:37 AM    BUN 17 2021 11:37 AM    CREATININE 0.88 2021 11:37 AM     Lab Results   Component Value Date/Time    ALKPHOSPHAT 107 (H) 2021 11:37 AM    ASTSGOT 21 2021 11:37 AM    ALTSGPT 16 " 01/04/2021 11:37 AM    TBILIRUBIN 0.6 01/04/2021 11:37 AM      Lab Results   Component Value Date/Time    HBA1C 6.3 (H) 01/04/2021 11:37 AM    HBA1C 6.4 (H) 12/21/2019 07:31 AM    HBA1C 6.1 (H) 08/17/2019 07:28 AM     No results found for: TSH  Lab Results   Component Value Date/Time    FREET4 0.67 02/06/2019 06:13 AM     Lab Results   Component Value Date/Time    WBC 10.7 01/04/2021 11:37 AM    RBC 5.51 (H) 01/04/2021 11:37 AM    HEMOGLOBIN 15.6 01/04/2021 11:37 AM    HEMATOCRIT 49.7 (H) 01/04/2021 11:37 AM    MCV 90.2 01/04/2021 11:37 AM    MCH 28.3 01/04/2021 11:37 AM    MCHC 31.4 (L) 01/04/2021 11:37 AM    MPV 10.2 01/04/2021 11:37 AM    NEUTSPOLYS 53.20 01/04/2021 11:37 AM    LYMPHOCYTES 36.90 01/04/2021 11:37 AM    MONOCYTES 7.60 01/04/2021 11:37 AM    EOSINOPHILS 1.40 01/04/2021 11:37 AM    BASOPHILS 0.70 01/04/2021 11:37 AM      Imaging     Pending    Assessment and Plan     Jessica Sorensen is a 70 y.o. female    1. Memory difficulties  There have been some worrisome cognitive decline, but also /wife relationship problems  Her TSH has been wnl, including recent  Because of the worsening and significant problems, I ordered MRI brain  - VITAMIN B12; Future  - VITAMIN B6; Future  - REFERRAL TO NEUROLOGY  - MR-BRAIN-W/O; Future    2. Personality change  - MR-BRAIN-W/O; Future  3. Anger  - MR-BRAIN-W/O; Future  4. Confusion  - MR-BRAIN-W/O; Future  As above.    5. IFG (impaired fasting glucose)  6. Dyslipidemia  Discussed management at recent visit from 5/4/2021    Counseling:   - Smoking:  Nonsmoker    Followup: Return if symptoms worsen or fail to improve.    All questions are answered.    Please note that this dictation was created using voice recognition software, and that there might be errors of robert and possibly content.

## 2021-05-28 ENCOUNTER — HOSPITAL ENCOUNTER (OUTPATIENT)
Dept: LAB | Facility: MEDICAL CENTER | Age: 71
End: 2021-05-28
Attending: INTERNAL MEDICINE
Payer: MEDICARE

## 2021-05-28 DIAGNOSIS — E55.9 HYPOVITAMINOSIS D: ICD-10-CM

## 2021-05-28 DIAGNOSIS — E78.5 DYSLIPIDEMIA: ICD-10-CM

## 2021-05-28 DIAGNOSIS — R41.3 MEMORY DIFFICULTIES: ICD-10-CM

## 2021-05-28 DIAGNOSIS — R73.01 IMPAIRED FASTING GLUCOSE: ICD-10-CM

## 2021-05-28 LAB
ALBUMIN SERPL BCP-MCNC: 4.3 G/DL (ref 3.2–4.9)
ALBUMIN/GLOB SERPL: 1.6 G/DL
ALP SERPL-CCNC: 88 U/L (ref 30–99)
ALT SERPL-CCNC: 14 U/L (ref 2–50)
ANION GAP SERPL CALC-SCNC: 10 MMOL/L (ref 7–16)
AST SERPL-CCNC: 15 U/L (ref 12–45)
BILIRUB SERPL-MCNC: 0.4 MG/DL (ref 0.1–1.5)
BUN SERPL-MCNC: 14 MG/DL (ref 8–22)
CALCIUM SERPL-MCNC: 8.9 MG/DL (ref 8.5–10.5)
CHLORIDE SERPL-SCNC: 109 MMOL/L (ref 96–112)
CHOLEST SERPL-MCNC: 165 MG/DL (ref 100–199)
CO2 SERPL-SCNC: 24 MMOL/L (ref 20–33)
CREAT SERPL-MCNC: 0.85 MG/DL (ref 0.5–1.4)
EST. AVERAGE GLUCOSE BLD GHB EST-MCNC: 134 MG/DL
FASTING STATUS PATIENT QL REPORTED: NORMAL
GLOBULIN SER CALC-MCNC: 2.7 G/DL (ref 1.9–3.5)
GLUCOSE SERPL-MCNC: 103 MG/DL (ref 65–99)
HBA1C MFR BLD: 6.3 % (ref 4–5.6)
HDLC SERPL-MCNC: 49 MG/DL
LDLC SERPL CALC-MCNC: 95 MG/DL
POTASSIUM SERPL-SCNC: 4.3 MMOL/L (ref 3.6–5.5)
PROT SERPL-MCNC: 7 G/DL (ref 6–8.2)
SODIUM SERPL-SCNC: 143 MMOL/L (ref 135–145)
TRIGL SERPL-MCNC: 107 MG/DL (ref 0–149)
VIT B12 SERPL-MCNC: 549 PG/ML (ref 211–911)

## 2021-05-28 PROCEDURE — 82306 VITAMIN D 25 HYDROXY: CPT

## 2021-05-28 PROCEDURE — 80061 LIPID PANEL: CPT

## 2021-05-28 PROCEDURE — 80053 COMPREHEN METABOLIC PANEL: CPT

## 2021-05-28 PROCEDURE — 84207 ASSAY OF VITAMIN B-6: CPT

## 2021-05-28 PROCEDURE — 82607 VITAMIN B-12: CPT

## 2021-05-28 PROCEDURE — 36415 COLL VENOUS BLD VENIPUNCTURE: CPT

## 2021-05-28 PROCEDURE — 83036 HEMOGLOBIN GLYCOSYLATED A1C: CPT

## 2021-05-31 LAB — 25(OH)D3 SERPL-MCNC: 23 NG/ML (ref 30–80)

## 2021-06-02 LAB — VIT B6 SERPL-MCNC: 21.5 NMOL/L (ref 20–125)

## 2021-06-11 ENCOUNTER — APPOINTMENT (OUTPATIENT)
Dept: RADIOLOGY | Facility: MEDICAL CENTER | Age: 71
End: 2021-06-11
Attending: INTERNAL MEDICINE
Payer: MEDICARE

## 2021-06-11 DIAGNOSIS — R41.0 CONFUSION: ICD-10-CM

## 2021-06-11 DIAGNOSIS — R41.3 MEMORY DIFFICULTIES: ICD-10-CM

## 2021-06-11 DIAGNOSIS — F68.8 PERSONALITY CHANGE: ICD-10-CM

## 2021-06-11 DIAGNOSIS — R45.4 ANGER: ICD-10-CM

## 2021-06-11 PROCEDURE — 70551 MRI BRAIN STEM W/O DYE: CPT | Mod: MG

## 2021-06-18 ENCOUNTER — HOSPITAL ENCOUNTER (OUTPATIENT)
Dept: RADIOLOGY | Facility: MEDICAL CENTER | Age: 71
End: 2021-06-18
Attending: INTERNAL MEDICINE
Payer: MEDICARE

## 2021-06-18 DIAGNOSIS — Z12.31 ENCOUNTER FOR SCREENING MAMMOGRAM FOR MALIGNANT NEOPLASM OF BREAST: ICD-10-CM

## 2021-06-18 PROCEDURE — 77063 BREAST TOMOSYNTHESIS BI: CPT

## 2021-06-18 PROCEDURE — 77080 DXA BONE DENSITY AXIAL: CPT

## 2021-06-22 ENCOUNTER — OFFICE VISIT (OUTPATIENT)
Dept: NEUROLOGY | Facility: MEDICAL CENTER | Age: 71
End: 2021-06-22
Attending: PSYCHIATRY & NEUROLOGY
Payer: MEDICARE

## 2021-06-22 VITALS
DIASTOLIC BLOOD PRESSURE: 70 MMHG | SYSTOLIC BLOOD PRESSURE: 118 MMHG | BODY MASS INDEX: 28.01 KG/M2 | WEIGHT: 176.15 LBS | HEART RATE: 82 BPM | TEMPERATURE: 97.8 F | OXYGEN SATURATION: 94 %

## 2021-06-22 DIAGNOSIS — F03.C0 SEVERE DEMENTIA (HCC): ICD-10-CM

## 2021-06-22 PROCEDURE — 99205 OFFICE O/P NEW HI 60 MIN: CPT | Performed by: PSYCHIATRY & NEUROLOGY

## 2021-06-22 PROCEDURE — 99211 OFF/OP EST MAY X REQ PHY/QHP: CPT | Performed by: PSYCHIATRY & NEUROLOGY

## 2021-06-22 ASSESSMENT — FIBROSIS 4 INDEX: FIB4 SCORE: 0.85
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
## 2021-08-22 ENCOUNTER — PATIENT MESSAGE (OUTPATIENT)
Dept: HEALTH INFORMATION MANAGEMENT | Facility: OTHER | Age: 71
End: 2021-08-22

## 2022-02-21 ENCOUNTER — PATIENT MESSAGE (OUTPATIENT)
Dept: HEALTH INFORMATION MANAGEMENT | Facility: OTHER | Age: 72
End: 2022-02-21
Payer: MEDICARE

## 2022-05-23 DIAGNOSIS — R03.0 ELEVATED BLOOD PRESSURE READING: ICD-10-CM

## 2022-05-23 DIAGNOSIS — M79.89 LEG SWELLING: ICD-10-CM

## 2022-05-23 RX ORDER — HYDROCHLOROTHIAZIDE 25 MG/1
TABLET ORAL
Qty: 30 TABLET | Refills: 0 | Status: SHIPPED | OUTPATIENT
Start: 2022-05-23 | End: 2022-07-07 | Stop reason: SDUPTHER

## 2022-06-07 ENCOUNTER — TELEPHONE (OUTPATIENT)
Dept: HEALTH INFORMATION MANAGEMENT | Facility: OTHER | Age: 72
End: 2022-06-07
Payer: MEDICARE

## 2022-06-29 ENCOUNTER — TELEPHONE (OUTPATIENT)
Dept: MEDICAL GROUP | Age: 72
End: 2022-06-29
Payer: MEDICARE

## 2022-06-29 NOTE — TELEPHONE ENCOUNTER
Patient has an upcoming appointment next week. Would you like to place any blood work orders for her?

## 2022-07-04 SDOH — ECONOMIC STABILITY: INCOME INSECURITY: IN THE LAST 12 MONTHS, WAS THERE A TIME WHEN YOU WERE NOT ABLE TO PAY THE MORTGAGE OR RENT ON TIME?: NO

## 2022-07-04 SDOH — ECONOMIC STABILITY: TRANSPORTATION INSECURITY
IN THE PAST 12 MONTHS, HAS THE LACK OF TRANSPORTATION KEPT YOU FROM MEDICAL APPOINTMENTS OR FROM GETTING MEDICATIONS?: NO

## 2022-07-04 SDOH — ECONOMIC STABILITY: FOOD INSECURITY: WITHIN THE PAST 12 MONTHS, THE FOOD YOU BOUGHT JUST DIDN'T LAST AND YOU DIDN'T HAVE MONEY TO GET MORE.: NEVER TRUE

## 2022-07-04 SDOH — ECONOMIC STABILITY: HOUSING INSECURITY: IN THE LAST 12 MONTHS, HOW MANY PLACES HAVE YOU LIVED?: 2

## 2022-07-04 SDOH — ECONOMIC STABILITY: INCOME INSECURITY: HOW HARD IS IT FOR YOU TO PAY FOR THE VERY BASICS LIKE FOOD, HOUSING, MEDICAL CARE, AND HEATING?: NOT HARD AT ALL

## 2022-07-04 SDOH — ECONOMIC STABILITY: HOUSING INSECURITY
IN THE LAST 12 MONTHS, WAS THERE A TIME WHEN YOU DID NOT HAVE A STEADY PLACE TO SLEEP OR SLEPT IN A SHELTER (INCLUDING NOW)?: NO

## 2022-07-04 SDOH — HEALTH STABILITY: PHYSICAL HEALTH: ON AVERAGE, HOW MANY MINUTES DO YOU ENGAGE IN EXERCISE AT THIS LEVEL?: 30 MIN

## 2022-07-04 SDOH — ECONOMIC STABILITY: FOOD INSECURITY: WITHIN THE PAST 12 MONTHS, YOU WORRIED THAT YOUR FOOD WOULD RUN OUT BEFORE YOU GOT MONEY TO BUY MORE.: NEVER TRUE

## 2022-07-04 SDOH — ECONOMIC STABILITY: TRANSPORTATION INSECURITY
IN THE PAST 12 MONTHS, HAS LACK OF RELIABLE TRANSPORTATION KEPT YOU FROM MEDICAL APPOINTMENTS, MEETINGS, WORK OR FROM GETTING THINGS NEEDED FOR DAILY LIVING?: NO

## 2022-07-04 SDOH — HEALTH STABILITY: PHYSICAL HEALTH: ON AVERAGE, HOW MANY DAYS PER WEEK DO YOU ENGAGE IN MODERATE TO STRENUOUS EXERCISE (LIKE A BRISK WALK)?: 1 DAY

## 2022-07-04 SDOH — ECONOMIC STABILITY: TRANSPORTATION INSECURITY
IN THE PAST 12 MONTHS, HAS LACK OF TRANSPORTATION KEPT YOU FROM MEETINGS, WORK, OR FROM GETTING THINGS NEEDED FOR DAILY LIVING?: NO

## 2022-07-04 SDOH — HEALTH STABILITY: MENTAL HEALTH
STRESS IS WHEN SOMEONE FEELS TENSE, NERVOUS, ANXIOUS, OR CAN'T SLEEP AT NIGHT BECAUSE THEIR MIND IS TROUBLED. HOW STRESSED ARE YOU?: TO SOME EXTENT

## 2022-07-04 ASSESSMENT — SOCIAL DETERMINANTS OF HEALTH (SDOH)
WITHIN THE PAST 12 MONTHS, YOU WORRIED THAT YOUR FOOD WOULD RUN OUT BEFORE YOU GOT THE MONEY TO BUY MORE: NEVER TRUE
HOW OFTEN DO YOU ATTENT MEETINGS OF THE CLUB OR ORGANIZATION YOU BELONG TO?: NEVER
IN A TYPICAL WEEK, HOW MANY TIMES DO YOU TALK ON THE PHONE WITH FAMILY, FRIENDS, OR NEIGHBORS?: NEVER
DO YOU BELONG TO ANY CLUBS OR ORGANIZATIONS SUCH AS CHURCH GROUPS UNIONS, FRATERNAL OR ATHLETIC GROUPS, OR SCHOOL GROUPS?: NO
HOW OFTEN DO YOU ATTEND CHURCH OR RELIGIOUS SERVICES?: NEVER
HOW HARD IS IT FOR YOU TO PAY FOR THE VERY BASICS LIKE FOOD, HOUSING, MEDICAL CARE, AND HEATING?: NOT HARD AT ALL
IN A TYPICAL WEEK, HOW MANY TIMES DO YOU TALK ON THE PHONE WITH FAMILY, FRIENDS, OR NEIGHBORS?: NEVER
HOW OFTEN DO YOU HAVE SIX OR MORE DRINKS ON ONE OCCASION: NEVER
DO YOU BELONG TO ANY CLUBS OR ORGANIZATIONS SUCH AS CHURCH GROUPS UNIONS, FRATERNAL OR ATHLETIC GROUPS, OR SCHOOL GROUPS?: NO
HOW OFTEN DO YOU GET TOGETHER WITH FRIENDS OR RELATIVES?: NEVER
HOW OFTEN DO YOU ATTENT MEETINGS OF THE CLUB OR ORGANIZATION YOU BELONG TO?: NEVER
HOW OFTEN DO YOU GET TOGETHER WITH FRIENDS OR RELATIVES?: NEVER
HOW OFTEN DO YOU ATTEND CHURCH OR RELIGIOUS SERVICES?: NEVER
HOW MANY DRINKS CONTAINING ALCOHOL DO YOU HAVE ON A TYPICAL DAY WHEN YOU ARE DRINKING: PATIENT DOES NOT DRINK
HOW OFTEN DO YOU HAVE A DRINK CONTAINING ALCOHOL: NEVER

## 2022-07-04 ASSESSMENT — LIFESTYLE VARIABLES
AUDIT-C TOTAL SCORE: 0
HOW MANY STANDARD DRINKS CONTAINING ALCOHOL DO YOU HAVE ON A TYPICAL DAY: PATIENT DOES NOT DRINK
SKIP TO QUESTIONS 9-10: 1
HOW OFTEN DO YOU HAVE A DRINK CONTAINING ALCOHOL: NEVER
HOW OFTEN DO YOU HAVE SIX OR MORE DRINKS ON ONE OCCASION: NEVER

## 2022-07-05 ENCOUNTER — TELEMEDICINE (OUTPATIENT)
Dept: MEDICAL GROUP | Age: 72
End: 2022-07-05
Payer: MEDICARE

## 2022-07-05 VITALS
BODY MASS INDEX: 26.37 KG/M2 | HEIGHT: 67 IN | SYSTOLIC BLOOD PRESSURE: 116 MMHG | WEIGHT: 168 LBS | DIASTOLIC BLOOD PRESSURE: 64 MMHG | HEART RATE: 67 BPM

## 2022-07-05 DIAGNOSIS — U07.1 COVID-19 VIRUS INFECTION: ICD-10-CM

## 2022-07-05 DIAGNOSIS — E55.9 HYPOVITAMINOSIS D: ICD-10-CM

## 2022-07-05 DIAGNOSIS — R73.01 IMPAIRED FASTING GLUCOSE: ICD-10-CM

## 2022-07-05 DIAGNOSIS — E78.5 DYSLIPIDEMIA: ICD-10-CM

## 2022-07-05 DIAGNOSIS — Z12.31 ENCOUNTER FOR SCREENING MAMMOGRAM FOR MALIGNANT NEOPLASM OF BREAST: ICD-10-CM

## 2022-07-05 PROCEDURE — 99214 OFFICE O/P EST MOD 30 MIN: CPT | Mod: 95 | Performed by: INTERNAL MEDICINE

## 2022-07-05 ASSESSMENT — FIBROSIS 4 INDEX: FIB4 SCORE: 0.86

## 2022-07-05 ASSESSMENT — PATIENT HEALTH QUESTIONNAIRE - PHQ9: CLINICAL INTERPRETATION OF PHQ2 SCORE: 0

## 2022-07-05 NOTE — PROGRESS NOTES
Telemedicine Visit: Established Patient     This Remote Face to Face encounter was conducted via Zoom. Given the importance of social distancing and other strategies recommended to reduce the risk of COVID-19 transmission, I am providing medical care to this patient via audio/video visit in place of an in person visit at the request of the patient. Verbal consent to telehealth, risks, benefits, and consequences were discussed. Patient retains the right to withdraw at any time. All existing confidentiality protections apply. The patient has access to all transmitted medical information. No dissemination of any patient images or information to other entities without further written consent.    CHIEF COMPLAINT   Covid infection    HPI  Jessica Sorensen is a 71 y.o. female who presents today for the following     Covid exposure,  Dyslipidemia, IFG  71-year-old, female, history of dyslipidemia and IFG  Covid exposure: 6 days ago, mother in law   - selfisolating    Patient has not have covid sx:  · Fever, chills  · Fatigue  · Muscle or body aches  · Headache  · New loss of taste or smell  · Congestion or runny nose  · Sore throat  · Cough  · Shortness of breath or difficulty breathing  · Nausea or vomiting  · Diarrhea, abdominal cramps.    Reviewed PMH, PSH, FH, SH, ALL, HCM/IMM, no changes  Reviewed MEDS, no changes    Patient Active Problem List    Diagnosis Date Noted   • Leg swelling 08/27/2019   • Gastroesophageal reflux disease 08/27/2019   • IFG (impaired fasting glucose) 01/02/2019   • Health care maintenance 12/03/2015   • Postmenopausal HRT (hormone replacement therapy) 06/25/2015   • Dyslipidemia 06/02/2015     CURRENT MEDICATIONS  Current Outpatient Medications   Medication Sig Dispense Refill   • hydroCHLOROthiazide (HYDRODIURIL) 25 MG Tab TAKE ONE TABLET BY MOUTH ONCE DAILY 30 Tablet 0   • atorvastatin (LIPITOR) 40 MG Tab TAKE ONE TABLET BY MOUTH ONCE DAILY 100 Tab 4   • estradiol (ESTRACE) 2 MG Tab Take 1  Tab by mouth every day. 90 Tab 3     No current facility-administered medications for this visit.     ALLERGIES  Allergies: Neosporin pain relieving  PAST MEDICAL HISTORY  Past Medical History:   Diagnosis Date   • Anxiety    • Dyslipidemia    • Hypovitaminosis D    • Memory problem    • Neck pain    • Panic attack      SURGICAL HISTORY  She  has a past surgical history that includes cervical fusion posterior (3/23/2009).  SOCIAL HISTORY  Social History     Tobacco Use   • Smoking status: Never Smoker   • Smokeless tobacco: Never Used   Vaping Use   • Vaping Use: Never used   Substance Use Topics   • Alcohol use: Yes     Alcohol/week: 0.6 oz     Types: 1 Standard drinks or equivalent per week   • Drug use: Never     Social History     Social History Narrative    Lives with .     Children: 4.     Work: retired     FAMILY HISTORY  Family History   Problem Relation Age of Onset   • Cancer Mother         colon   • Heart Disease Mother    • Hypertension Mother    • Hyperlipidemia Mother    • Diabetes Father    • No Known Problems Maternal Grandmother    • No Known Problems Maternal Grandfather    • No Known Problems Paternal Grandmother    • No Known Problems Paternal Grandfather    • Psychiatric Illness Neg Hx    • Stroke Neg Hx    • Thyroid Neg Hx      Family Status   Relation Name Status   • Mo     • Fa     • MGMo     • MGFa     • PGMo     • PGFa     • Neg Hx  (Not Specified)       ROS   Constitutional: Negative for fever, chills, fatigue.  HENT: Negative for congestion, sore throat.  Eyes: Negative for vision problems.   Respiratory: Negative for cough, shortness of breath.  Cardiovascular: Negative for chest pain, palpitations.   Gastrointestinal: Negative for heartburn, nausea, abdominal pain.   Genitourinary: Negative for dysuria.  Musculoskeletal: Negative for significant myalgia, back and joint pain.   Skin: Negative for rash.   Neuro: Negative for  "dizziness, weakness and headaches.   Endo/Heme/Allergies: Does not bruise/bleed easily.   Psychiatric/Behavioral: Negative for depression.    Objective   Vitals obtained by patient:  Blood Pressure 116/64   Pulse 67   Height 1.702 m (5' 7\")   Weight 76.2 kg (168 lb)   Body Mass Index 26.31 kg/m²   Physical Exam:  Constitutional: Alert, no distress, well-groomed.  Skin: No rash in visible areas.  Eye: Round. Conjunctiva clear, lids normal.  ENMT: Lips pink without lesions, good dentition. Phonation normal.  Neck: No visible masses or thyromegaly. Moves freely without pain.  CV: no peripheral cyanosis, tachycardia.  Respiratory: Unlabored respiratory effort, no cough or audible wheezing.  Psych: Alert and oriented x3, normal affect and mood.     Labs     Labs are reviewed and discussed with a patient  Lab Results   Component Value Date/Time    CHOLSTRLTOT 165 05/28/2021 06:44 AM    LDL 95 05/28/2021 06:44 AM    HDL 49 05/28/2021 06:44 AM    TRIGLYCERIDE 107 05/28/2021 06:44 AM       Lab Results   Component Value Date/Time    SODIUM 143 05/28/2021 06:44 AM    POTASSIUM 4.3 05/28/2021 06:44 AM    CHLORIDE 109 05/28/2021 06:44 AM    CO2 24 05/28/2021 06:44 AM    GLUCOSE 103 (H) 05/28/2021 06:44 AM    BUN 14 05/28/2021 06:44 AM    CREATININE 0.85 05/28/2021 06:44 AM     Lab Results   Component Value Date/Time    ALKPHOSPHAT 88 05/28/2021 06:44 AM    ASTSGOT 15 05/28/2021 06:44 AM    ALTSGPT 14 05/28/2021 06:44 AM    TBILIRUBIN 0.4 05/28/2021 06:44 AM      Lab Results   Component Value Date/Time    HBA1C 6.3 (H) 05/28/2021 06:44 AM    HBA1C 6.3 (H) 01/04/2021 11:37 AM    HBA1C 6.4 (H) 12/21/2019 07:31 AM     No results found for: TSH  Lab Results   Component Value Date/Time    FREET4 0.67 02/06/2019 06:13 AM       Lab Results   Component Value Date/Time    WBC 10.7 01/04/2021 11:37 AM    RBC 5.51 (H) 01/04/2021 11:37 AM    HEMOGLOBIN 15.6 01/04/2021 11:37 AM    HEMATOCRIT 49.7 (H) 01/04/2021 11:37 AM    MCV 90.2 " 01/04/2021 11:37 AM    MCH 28.3 01/04/2021 11:37 AM    MCHC 31.4 (L) 01/04/2021 11:37 AM    MPV 10.2 01/04/2021 11:37 AM    NEUTSPOLYS 53.20 01/04/2021 11:37 AM    LYMPHOCYTES 36.90 01/04/2021 11:37 AM    MONOCYTES 7.60 01/04/2021 11:37 AM    EOSINOPHILS 1.40 01/04/2021 11:37 AM    BASOPHILS 0.70 01/04/2021 11:37 AM      Imaging      None    Assessment and Plan     Jessica Sorensen is a 71 y.o. female    1. COVID-19 virus infection  The patient had exposure to COVID 6 days ago, has not have any symptoms    2. Dyslipidemia  Pending labs  - Comp Metabolic Panel; Future  - Lipid Profile; Future  3. IFG (impaired fasting glucose)  Pending labs  - Comp Metabolic Panel; Future  - HEMOGLOBIN A1C; Future    4. Hypovitaminosis D  Pending labs  - VITAMIN D,25 HYDROXY; Future    5. Encounter for screening mammogram for malignant neoplasm of breast  - MA-SCREENING MAMMO BILAT W/TOMOSYNTHESIS W/CAD; Future    Follow-up: She has schedule appointment in 2 days, review labs

## 2022-07-06 ENCOUNTER — HOSPITAL ENCOUNTER (OUTPATIENT)
Dept: LAB | Facility: MEDICAL CENTER | Age: 72
End: 2022-07-06
Attending: INTERNAL MEDICINE
Payer: MEDICARE

## 2022-07-06 ENCOUNTER — HOSPITAL ENCOUNTER (OUTPATIENT)
Dept: RADIOLOGY | Facility: MEDICAL CENTER | Age: 72
End: 2022-07-06
Attending: INTERNAL MEDICINE
Payer: MEDICARE

## 2022-07-06 DIAGNOSIS — R73.01 IMPAIRED FASTING GLUCOSE: ICD-10-CM

## 2022-07-06 DIAGNOSIS — Z12.31 ENCOUNTER FOR SCREENING MAMMOGRAM FOR MALIGNANT NEOPLASM OF BREAST: ICD-10-CM

## 2022-07-06 DIAGNOSIS — E78.5 DYSLIPIDEMIA: ICD-10-CM

## 2022-07-06 DIAGNOSIS — E55.9 HYPOVITAMINOSIS D: ICD-10-CM

## 2022-07-06 LAB
25(OH)D3 SERPL-MCNC: 28 NG/ML (ref 30–100)
ALBUMIN SERPL BCP-MCNC: 4.2 G/DL (ref 3.2–4.9)
ALBUMIN/GLOB SERPL: 1.7 G/DL
ALP SERPL-CCNC: 98 U/L (ref 30–99)
ALT SERPL-CCNC: 10 U/L (ref 2–50)
ANION GAP SERPL CALC-SCNC: 11 MMOL/L (ref 7–16)
AST SERPL-CCNC: 10 U/L (ref 12–45)
BILIRUB SERPL-MCNC: 0.9 MG/DL (ref 0.1–1.5)
BUN SERPL-MCNC: 13 MG/DL (ref 8–22)
CALCIUM SERPL-MCNC: 9.4 MG/DL (ref 8.5–10.5)
CHLORIDE SERPL-SCNC: 102 MMOL/L (ref 96–112)
CHOLEST SERPL-MCNC: 138 MG/DL (ref 100–199)
CO2 SERPL-SCNC: 26 MMOL/L (ref 20–33)
CREAT SERPL-MCNC: 0.69 MG/DL (ref 0.5–1.4)
EST. AVERAGE GLUCOSE BLD GHB EST-MCNC: 131 MG/DL
FASTING STATUS PATIENT QL REPORTED: NORMAL
GFR SERPLBLD CREATININE-BSD FMLA CKD-EPI: 91 ML/MIN/1.73 M 2
GLOBULIN SER CALC-MCNC: 2.5 G/DL (ref 1.9–3.5)
GLUCOSE SERPL-MCNC: 94 MG/DL (ref 65–99)
HBA1C MFR BLD: 6.2 % (ref 4–5.6)
HDLC SERPL-MCNC: 62 MG/DL
LDLC SERPL CALC-MCNC: 55 MG/DL
POTASSIUM SERPL-SCNC: 4.1 MMOL/L (ref 3.6–5.5)
PROT SERPL-MCNC: 6.7 G/DL (ref 6–8.2)
SODIUM SERPL-SCNC: 139 MMOL/L (ref 135–145)
TRIGL SERPL-MCNC: 104 MG/DL (ref 0–149)

## 2022-07-06 PROCEDURE — 83036 HEMOGLOBIN GLYCOSYLATED A1C: CPT

## 2022-07-06 PROCEDURE — 82306 VITAMIN D 25 HYDROXY: CPT

## 2022-07-06 PROCEDURE — 80053 COMPREHEN METABOLIC PANEL: CPT

## 2022-07-06 PROCEDURE — 36415 COLL VENOUS BLD VENIPUNCTURE: CPT

## 2022-07-06 PROCEDURE — 77063 BREAST TOMOSYNTHESIS BI: CPT

## 2022-07-06 PROCEDURE — 80061 LIPID PANEL: CPT

## 2022-07-07 ENCOUNTER — TELEMEDICINE (OUTPATIENT)
Dept: MEDICAL GROUP | Age: 72
End: 2022-07-07
Payer: MEDICARE

## 2022-07-07 VITALS
WEIGHT: 168 LBS | DIASTOLIC BLOOD PRESSURE: 54 MMHG | HEIGHT: 67 IN | SYSTOLIC BLOOD PRESSURE: 116 MMHG | BODY MASS INDEX: 26.37 KG/M2

## 2022-07-07 DIAGNOSIS — R73.01 IMPAIRED FASTING GLUCOSE: ICD-10-CM

## 2022-07-07 DIAGNOSIS — M79.89 LEG SWELLING: ICD-10-CM

## 2022-07-07 DIAGNOSIS — I10 ESSENTIAL HYPERTENSION: ICD-10-CM

## 2022-07-07 DIAGNOSIS — Z00.00 HEALTH CARE MAINTENANCE: ICD-10-CM

## 2022-07-07 DIAGNOSIS — Z79.890 POSTMENOPAUSAL HRT (HORMONE REPLACEMENT THERAPY): ICD-10-CM

## 2022-07-07 DIAGNOSIS — K21.9 GASTROESOPHAGEAL REFLUX DISEASE, UNSPECIFIED WHETHER ESOPHAGITIS PRESENT: ICD-10-CM

## 2022-07-07 DIAGNOSIS — Z00.00 MEDICARE ANNUAL WELLNESS VISIT, SUBSEQUENT: ICD-10-CM

## 2022-07-07 DIAGNOSIS — E78.5 DYSLIPIDEMIA: ICD-10-CM

## 2022-07-07 DIAGNOSIS — E55.9 HYPOVITAMINOSIS D: ICD-10-CM

## 2022-07-07 PROCEDURE — G0439 PPPS, SUBSEQ VISIT: HCPCS | Mod: 95 | Performed by: INTERNAL MEDICINE

## 2022-07-07 RX ORDER — HYDROCHLOROTHIAZIDE 25 MG/1
25 TABLET ORAL DAILY
Qty: 100 TABLET | Refills: 1 | Status: SHIPPED | OUTPATIENT
Start: 2022-07-07

## 2022-07-07 RX ORDER — ERGOCALCIFEROL 1.25 MG/1
50000 CAPSULE ORAL
Qty: 12 CAPSULE | Refills: 3 | Status: SHIPPED | OUTPATIENT
Start: 2022-07-07

## 2022-07-07 RX ORDER — ATORVASTATIN CALCIUM 40 MG/1
40 TABLET, FILM COATED ORAL DAILY
Qty: 100 TABLET | Refills: 4 | Status: SHIPPED | OUTPATIENT
Start: 2022-07-07

## 2022-07-07 ASSESSMENT — ACTIVITIES OF DAILY LIVING (ADL): BATHING_REQUIRES_ASSISTANCE: 0

## 2022-07-07 ASSESSMENT — ENCOUNTER SYMPTOMS: GENERAL WELL-BEING: EXCELLENT

## 2022-07-07 ASSESSMENT — PATIENT HEALTH QUESTIONNAIRE - PHQ9: CLINICAL INTERPRETATION OF PHQ2 SCORE: 0

## 2022-07-07 ASSESSMENT — FIBROSIS 4 INDEX: FIB4 SCORE: 0.68

## 2022-07-07 NOTE — PROGRESS NOTES
Chief Complaint   Patient presents with   • Annual Wellness Visit     HPI:  Jessica is a 71 y.o. here for Medicare Annual Wellness Visit    Patient Active Problem List    Diagnosis Date Noted   • Leg swelling 08/27/2019   • Gastroesophageal reflux disease 08/27/2019   • IFG (impaired fasting glucose) 01/02/2019   • Health care maintenance 12/03/2015   • Postmenopausal HRT (hormone replacement therapy) 06/25/2015   • Dyslipidemia 06/02/2015     Current Outpatient Medications   Medication Sig Dispense Refill   • hydroCHLOROthiazide (HYDRODIURIL) 25 MG Tab Take 1 Tablet by mouth every day. 100 Tablet 1   • atorvastatin (LIPITOR) 40 MG Tab Take 1 Tablet by mouth every day. 100 Tablet 4   • vitamin D2, Ergocalciferol, (DRISDOL) 1.25 MG (70067 UT) Cap capsule Take 1 Capsule by mouth every 7 days. 12 Capsule 3   • estradiol (ESTRACE) 2 MG Tab Take 1 Tab by mouth every day. 90 Tab 3     No current facility-administered medications for this visit.      Patient is taking medications as noted in medication list.  Current supplements as per medication list.     Allergies: Neosporin pain relieving    Current social contact/activities:  Family    Is patient current with immunizations? No, due for PNEUMOVAX (PPSV23), SHINGRIX (Shingles) and COVID. Patient is interested in receiving NONE today.    She  reports that she has never smoked. She has never used smokeless tobacco. She reports previous alcohol use of about 0.6 oz of alcohol per week. She reports that she does not use drugs.  Counseling given: Not Answered    DPA/Advanced directive: Patient does not have an Advanced Directive.  A packet and workshop information was given on Advanced Directives.    ROS:    Gait: Uses no assistive device   Ostomy: No   Other tubes: No   Amputations: No   Chronic oxygen use No   Last eye exam    Wears hearing aids: No   : Denies any urinary leakage during the last 6 months    Screening:    Depression Screening  Little interest or pleasure  in doing things?  0 - not at all  Feeling down, depressed, or hopeless? 0 - not at all  Patient Health Questionnaire Score: 0    Screening for Cognitive Impairment  Three Minute Recall (daughter, heaven, mountain)  0/3 Patient could not remember the words.  Christopher clock face with all 12 numbers and set the hands to show 10 past 11.  Yes    If cognitive concerns identified, deferred for follow up unless specifically addressed in assessment and plan.    Fall Risk Assessment  Has the patient had two or more falls in the last year or any fall with injury in the last year?  Yes  If fall risk identified, deferred for follow up unless specifically addressed in assessment and plan.    Safety Assessment  Throw rugs on floor.  No  Handrails on all stairs.  Yes  Good lighting in all hallways.  Yes  Difficulty hearing.  No  Patient counseled about all safety risks that were identified.    Functional Assessment ADLs  Are there any barriers preventing you from cooking for yourself or meeting nutritional needs?  No.    Are there any barriers preventing you from driving safely or obtaining transportation?  Yes.  transport her everywhere. She no longer has a license  Are there any barriers preventing you from using a telephone or calling for help?  No.    Are there any barriers preventing you from shopping?  No.    Are there any barriers preventing you from taking care of your own finances?  Yes. For years the  takes care of all the finances.  Are there any barriers preventing you from managing your medications?  Yes.  takes care of managing medications  Are there any barriers preventing you from showering, bathing or dressing yourself?  No.    Are you currently engaging in any exercise or physical activity?  Yes.  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  Excellent.    Health Maintenance Summary          Overdue - COVID-19 Vaccine (1) Overdue - never done    No completion  history exists for this topic.          Overdue - IMM ZOSTER VACCINES (1 of 2) Overdue - never done    No completion history exists for this topic.          Overdue - IMM PNEUMOCOCCAL VACCINE: 65+ Years (2 - PPSV23 or PCV20) Overdue since 12/4/2020 12/04/2019  Imm Admin: Pneumococcal Conjugate Vaccine (Prevnar/PCV-13)          IMM INFLUENZA (1) Next due on 9/1/2022 12/04/2019  Imm Admin: Influenza Vaccine Adult HD    11/18/2010  Imm Admin: INFLUENZA TIV (IM)    11/18/2010  Imm Admin: Influenza Seasonal Injectable - Historical Data          MAMMOGRAM (Yearly) Next due on 7/6/2023 07/06/2022  MA-SCREENING MAMMO BILAT W/TOMOSYNTHESIS W/CAD    06/18/2021  MA-SCREENING MAMMO BILAT W/TOMOSYNTHESIS W/CAD    09/14/2019  MA-SCREENING MAMMO BILAT W/TOMOSYNTHESIS W/CAD    06/02/2015  Patient Declined    05/29/2014  MA-SCREENING MAMMOGRAM W/ CAD    Only the first 5 history entries have been loaded, but more history exists.          Annual Wellness Visit (Every 366 Days) Next due on 7/8/2023 07/07/2022  Visit Dx: Medicare annual wellness visit, subsequent    07/07/2022  Subsequent Annual Wellness Visit - Includes PPPS ()    12/31/2020  Done    12/31/2020  Visit Dx: Medicare annual wellness visit, subsequent    12/31/2020  Subsequent Annual Wellness Visit - Includes PPPS ()    Only the first 5 history entries have been loaded, but more history exists.          COLORECTAL CANCER SCREENING (COLONOSCOPY - Every 10 Years) Next due on 6/2/2025 06/02/2015  COLONOSCOPY (Postponed)          BONE DENSITY (Every 5 Years) Next due on 6/18/2026 06/18/2021  DS-BONE DENSITY STUDY (DEXA)    06/02/2015  Postponed    10/22/2009  DS-BONE DENSITY STUDY (DEXA)          IMM DTaP/Tdap/Td Vaccine (3 - Td or Tdap) Next due on 12/13/2031 12/13/2021  Imm Admin: Tdap Vaccine    09/08/2014  Imm Admin: Tdap Vaccine          HEPATITIS C SCREENING  Completed    12/21/2019  HEP C VIRUS ANTIBODY          IMM HEP B VACCINE (Series  "Information) Aged Out    No completion history exists for this topic.          IMM MENINGOCOCCAL VACCINE (MCV4) (Series Information) Aged Out    No completion history exists for this topic.          Discontinued - PAP SMEAR  Discontinued    No completion history exists for this topic.              Patient Care Team:  Chad Washburn M.D. as PCP - General (Family Medicine)  Chad Washburn M.D. as PCP - [facility] Paneled  Dr. Bill Carranza as Consulting Physician (Ophthalmology)  Bill Waddell D.P.M. as Consulting Physician (Podiatry)  Almita Dillon (Inactive) as [facility]     Social History     Tobacco Use   • Smoking status: Never Smoker   • Smokeless tobacco: Never Used   Vaping Use   • Vaping Use: Never used   Substance Use Topics   • Alcohol use: Not Currently     Alcohol/week: 0.6 oz     Types: 1 Standard drinks or equivalent per week   • Drug use: Never     Family History   Problem Relation Age of Onset   • Cancer Mother         colon   • Heart Disease Mother    • Hypertension Mother    • Hyperlipidemia Mother    • Diabetes Father    • No Known Problems Maternal Grandmother    • No Known Problems Maternal Grandfather    • No Known Problems Paternal Grandmother    • No Known Problems Paternal Grandfather    • Psychiatric Illness Neg Hx    • Stroke Neg Hx    • Thyroid Neg Hx      She  has a past medical history of Anxiety, Dyslipidemia, Hypovitaminosis D, Memory problem, Neck pain, and Panic attack.    She has no past medical history of Asthma, Chronic airway obstruction, not elsewhere classified, or Type II or unspecified type diabetes mellitus without mention of complication, not stated as uncontrolled.   Past Surgical History:   Procedure Laterality Date   • CERVICAL FUSION POSTERIOR  03/23/2009    Performed by ANYI GORDON at SUR[facility]   • ABDOMINAL HYSTERECTOMY TOTAL       Exam:   /54   Ht 1.702 m (5' 7\")   Wt 76.2 kg (168 lb)  Body mass " index is 26.31 kg/m².  Hearing fair.    Dentition fair  Alert, oriented in no acute distress.  Eye contact is good, speech goal directed, affect calm    Labs  Reviewed and discussed  Lab Results   Component Value Date/Time    CHOLSTRLTOT 138 07/06/2022 08:13 AM    LDL 55 07/06/2022 08:13 AM    HDL 62 07/06/2022 08:13 AM    TRIGLYCERIDE 104 07/06/2022 08:13 AM       Lab Results   Component Value Date/Time    SODIUM 139 07/06/2022 08:13 AM    POTASSIUM 4.1 07/06/2022 08:13 AM    CHLORIDE 102 07/06/2022 08:13 AM    CO2 26 07/06/2022 08:13 AM    GLUCOSE 94 07/06/2022 08:13 AM    BUN 13 07/06/2022 08:13 AM    CREATININE 0.69 07/06/2022 08:13 AM     Lab Results   Component Value Date/Time    ALKPHOSPHAT 98 07/06/2022 08:13 AM    ASTSGOT 10 (L) 07/06/2022 08:13 AM    ALTSGPT 10 07/06/2022 08:13 AM    TBILIRUBIN 0.9 07/06/2022 08:13 AM      Lab Results   Component Value Date/Time    HBA1C 6.2 (H) 07/06/2022 08:13 AM    HBA1C 6.3 (H) 05/28/2021 06:44 AM    HBA1C 6.3 (H) 01/04/2021 11:37 AM     No results found for: TSH  Lab Results   Component Value Date/Time    FREET4 0.67 02/06/2019 06:13 AM     Lab Results   Component Value Date/Time    WBC 10.7 01/04/2021 11:37 AM    RBC 5.51 (H) 01/04/2021 11:37 AM    HEMOGLOBIN 15.6 01/04/2021 11:37 AM    HEMATOCRIT 49.7 (H) 01/04/2021 11:37 AM    MCV 90.2 01/04/2021 11:37 AM    MCH 28.3 01/04/2021 11:37 AM    MCHC 31.4 (L) 01/04/2021 11:37 AM    MPV 10.2 01/04/2021 11:37 AM    NEUTSPOLYS 53.20 01/04/2021 11:37 AM    LYMPHOCYTES 36.90 01/04/2021 11:37 AM    MONOCYTES 7.60 01/04/2021 11:37 AM    EOSINOPHILS 1.40 01/04/2021 11:37 AM    BASOPHILS 0.70 01/04/2021 11:37 AM      Assessment and Plan    1. Medicare annual wellness visit, subsequent  Reviewed PMH, PSH, FH, SH, ALL, MEDS, HCM/IMM.   - Subsequent Annual Wellness Visit - Includes PPPS ()    2. Health care maintenance  Per HPI.  - Subsequent Annual Wellness Visit - Includes PPPS ()    3. Essential hypertension  -  Controlled, continue with current management.  - Subsequent Annual Wellness Visit - Includes PPPS ()  - hydroCHLOROthiazide (HYDRODIURIL) 25 MG Tab; Take 1 Tablet by mouth every day.  Dispense: 100 Tablet; Refill: 1  4. Leg swelling  - Subsequent Annual Wellness Visit - Includes PPPS ()  - hydroCHLOROthiazide (HYDRODIURIL) 25 MG Tab; Take 1 Tablet by mouth every day.  Dispense: 100 Tablet; Refill: 1    5. Dyslipidemia  - Controlled, continue with current management.  - Subsequent Annual Wellness Visit - Includes PPPS ()  - atorvastatin (LIPITOR) 40 MG Tab; Take 1 Tablet by mouth every day.  Dispense: 100 Tablet; Refill: 4  - Comp Metabolic Panel; Future    6. IFG (impaired fasting glucose)  FBG 94, A1c 6.2 - advised low mukund diet, exercise as much as possible  - Subsequent Annual Wellness Visit - Includes PPPS ()  - Comp Metabolic Panel; Future  - HEMOGLOBIN A1C; Future    7. Hypovitaminosis D  - Uncontrolled, switch to high dose Vit D.  - Subsequent Annual Wellness Visit - Includes PPPS ()  - VITAMIN D,25 HYDROXY; Future    8. Gastroesophageal reflux disease, unspecified whether esophagitis present  - Controlled, continue with current management.  - Subsequent Annual Wellness Visit - Includes PPPS ()    9. Postmenopausal HRT (hormone replacement therapy)  - Controlled, continue with current management.  - Subsequent Annual Wellness Visit - Includes PPPS ()    Services suggested: No services needed at this time  Health Care Screening recommendations as per orders if indicated.  Referrals offered: PT/OT/Nutrition counseling/Behavioral Health/Smoking cessation as per orders if indicated.    Discussion today about general wellness and lifestyle habits:    · Prevent falls and reduce trip hazards; Cautioned about securing or removing rugs.  · Have a working fire alarm and carbon monoxide detector;   · Engage in regular physical activity and social activities     Follow-up: Return in  about 6 months (around 1/7/2023), or if symptoms worsen or fail to improve.

## 2022-07-08 PROBLEM — E55.9 HYPOVITAMINOSIS D: Status: ACTIVE | Noted: 2022-07-08

## 2022-07-08 PROBLEM — I10 ESSENTIAL HYPERTENSION: Status: ACTIVE | Noted: 2022-07-08

## 2022-08-18 DIAGNOSIS — Z79.890 POSTMENOPAUSAL HRT (HORMONE REPLACEMENT THERAPY): ICD-10-CM

## 2022-08-21 RX ORDER — ESTRADIOL 2 MG/1
TABLET ORAL
Qty: 90 TABLET | Refills: 0 | Status: SHIPPED | OUTPATIENT
Start: 2022-08-21

## 2025-04-11 ENCOUNTER — HOSPITAL ENCOUNTER (EMERGENCY)
Facility: MEDICAL CENTER | Age: 75
End: 2025-04-11

## 2025-04-11 PROBLEM — Z51.5 COMFORT MEASURES ONLY STATUS: Status: ACTIVE | Noted: 2025-04-11
